# Patient Record
Sex: FEMALE | Race: WHITE | NOT HISPANIC OR LATINO | Employment: OTHER | URBAN - METROPOLITAN AREA
[De-identification: names, ages, dates, MRNs, and addresses within clinical notes are randomized per-mention and may not be internally consistent; named-entity substitution may affect disease eponyms.]

---

## 2017-05-01 ENCOUNTER — APPOINTMENT (OUTPATIENT)
Dept: WOMENS IMAGING | Facility: HOSPITAL | Age: 82
End: 2017-05-01

## 2017-05-01 PROCEDURE — G0206 DX MAMMO INCL CAD UNI: HCPCS | Performed by: RADIOLOGY

## 2017-05-01 PROCEDURE — G0279 TOMOSYNTHESIS, MAMMO: HCPCS | Performed by: RADIOLOGY

## 2017-05-01 PROCEDURE — MDREVIEWSP: Performed by: RADIOLOGY

## 2017-05-01 PROCEDURE — 76641 ULTRASOUND BREAST COMPLETE: CPT | Performed by: RADIOLOGY

## 2018-01-04 ENCOUNTER — APPOINTMENT (OUTPATIENT)
Dept: WOMENS IMAGING | Facility: HOSPITAL | Age: 83
End: 2018-01-04

## 2018-01-04 PROCEDURE — MDREVIEWSP: Performed by: RADIOLOGY

## 2018-01-04 PROCEDURE — G0279 TOMOSYNTHESIS, MAMMO: HCPCS | Performed by: RADIOLOGY

## 2018-01-04 PROCEDURE — 77065 DX MAMMO INCL CAD UNI: CPT | Performed by: RADIOLOGY

## 2018-01-04 PROCEDURE — 77067 SCR MAMMO BI INCL CAD: CPT | Performed by: RADIOLOGY

## 2018-01-04 PROCEDURE — 77063 BREAST TOMOSYNTHESIS BI: CPT | Performed by: RADIOLOGY

## 2018-07-05 ENCOUNTER — APPOINTMENT (OUTPATIENT)
Dept: WOMENS IMAGING | Facility: HOSPITAL | Age: 83
End: 2018-07-05

## 2018-07-05 PROCEDURE — G0279 TOMOSYNTHESIS, MAMMO: HCPCS | Performed by: RADIOLOGY

## 2018-07-05 PROCEDURE — MDREVIEWSP: Performed by: RADIOLOGY

## 2018-07-05 PROCEDURE — 77065 DX MAMMO INCL CAD UNI: CPT | Performed by: RADIOLOGY

## 2019-01-09 ENCOUNTER — APPOINTMENT (OUTPATIENT)
Dept: WOMENS IMAGING | Facility: HOSPITAL | Age: 84
End: 2019-01-09

## 2019-01-09 PROCEDURE — G0279 TOMOSYNTHESIS, MAMMO: HCPCS | Performed by: RADIOLOGY

## 2019-01-09 PROCEDURE — MDREVIEWSP: Performed by: RADIOLOGY

## 2019-01-09 PROCEDURE — 77066 DX MAMMO INCL CAD BI: CPT | Performed by: RADIOLOGY

## 2019-01-15 ENCOUNTER — APPOINTMENT (OUTPATIENT)
Dept: WOMENS IMAGING | Facility: HOSPITAL | Age: 84
End: 2019-01-15

## 2019-01-15 PROCEDURE — 19081 BX BREAST 1ST LESION STRTCTC: CPT | Performed by: RADIOLOGY

## 2019-10-25 ENCOUNTER — HOSPITAL ENCOUNTER (OUTPATIENT)
Dept: GENERAL RADIOLOGY | Facility: HOSPITAL | Age: 84
Discharge: HOME | End: 2019-10-25

## 2019-10-25 ENCOUNTER — HOSPITAL ENCOUNTER (OUTPATIENT)
Dept: GENERAL RADIOLOGY | Facility: HOSPITAL | Age: 84
Discharge: HOME | End: 2019-10-25
Admitting: INTERNAL MEDICINE

## 2019-10-25 ENCOUNTER — TRANSCRIBE ORDERS (OUTPATIENT)
Dept: ADMINISTRATIVE | Facility: HOSPITAL | Age: 84
End: 2019-10-25

## 2019-10-25 DIAGNOSIS — M54.6 PAIN IN THORACIC SPINE: Primary | ICD-10-CM

## 2019-10-25 PROCEDURE — 71046 X-RAY EXAM CHEST 2 VIEWS: CPT

## 2019-10-25 PROCEDURE — 72070 X-RAY EXAM THORAC SPINE 2VWS: CPT

## 2019-12-19 ENCOUNTER — OFFICE VISIT (OUTPATIENT)
Dept: CARDIOLOGY | Facility: CLINIC | Age: 84
End: 2019-12-19

## 2019-12-19 VITALS
WEIGHT: 134.8 LBS | DIASTOLIC BLOOD PRESSURE: 70 MMHG | BODY MASS INDEX: 24.8 KG/M2 | HEART RATE: 78 BPM | SYSTOLIC BLOOD PRESSURE: 142 MMHG | HEIGHT: 62 IN

## 2019-12-19 DIAGNOSIS — R00.2 PALPITATIONS: ICD-10-CM

## 2019-12-19 DIAGNOSIS — I10 ESSENTIAL HYPERTENSION: Primary | ICD-10-CM

## 2019-12-19 DIAGNOSIS — R01.1 HEART MURMUR: ICD-10-CM

## 2019-12-19 PROCEDURE — 93000 ELECTROCARDIOGRAM COMPLETE: CPT | Performed by: INTERNAL MEDICINE

## 2019-12-19 PROCEDURE — 99204 OFFICE O/P NEW MOD 45 MIN: CPT | Performed by: INTERNAL MEDICINE

## 2019-12-19 RX ORDER — ROSUVASTATIN CALCIUM 10 MG/1
1 TABLET, COATED ORAL DAILY
COMMUNITY

## 2019-12-19 RX ORDER — CETIRIZINE HYDROCHLORIDE 10 MG/1
1 TABLET ORAL DAILY PRN
COMMUNITY
End: 2022-03-15

## 2019-12-19 RX ORDER — HYDROCHLOROTHIAZIDE 25 MG/1
1 TABLET ORAL DAILY
COMMUNITY

## 2019-12-19 RX ORDER — MONTELUKAST SODIUM 10 MG/1
1 TABLET ORAL DAILY
Status: ON HOLD | COMMUNITY
End: 2021-12-26

## 2019-12-19 RX ORDER — AMLODIPINE BESYLATE AND BENAZEPRIL HYDROCHLORIDE 10; 40 MG/1; MG/1
1 CAPSULE ORAL DAILY
COMMUNITY
End: 2021-12-27 | Stop reason: HOSPADM

## 2019-12-19 NOTE — PROGRESS NOTES
Date of Office Visit: 2019  Encounter Provider: Belkys Burton MD  Place of Service: [facility] CARDIOLOGY  Patient Name: Jia Gillis  :1935      Patient ID:  Jia Gillis is a 84 y.o. female is here for hypertension.           History of Present Illness    She has a history of hypertension, hyperlipidemia, thyroid disease, history of breast cancer with radiation therapy.  Her breast cancer was first diagnosed in the  on the right side and she had a lumpectomy.  She had a recurrence in  on the right side and had a lumpectomy with radiation therapy and tamoxifen.  She then had a recurrence of breast cancer on the left side in  with a left-sided lumpectomy and no adjuvant therapy.    She had an echocardiogram done 2015 showing normal left ventricular systolic function, ejection fraction 60-65% with aortic valve sclerosis, no stenosis and trace aortic insufficiency.    Labs on 3/16/2019 show normal TSH, normal CBC, creatinine 1.19, otherwise normal CMP, HDL 58, triglycerides 165, , non-.    She is , has 3 children and is retired.  She uses no cigarettes and has social alcohol.  She has 4 cups of coffee per day.  Her sister had a myocardial infarction at the age of 40.  Her mother heart disease and hypertension.  Her son has diabetes.    She has had a lot of back pain and due to this, she is stopped exercising.  She got a steroid injection and began having some mild chest pressure after this and knew her blood pressure was elevated.  She saw Dr. Ramos and her blood pressure was 200.  He doubled her amlodipine.  Is taken about a month but now her blood pressure is back down to the 140s.  She has been checking it at home.  She has had no further chest tightness or pressure.  She denies exertional dyspnea, orthopnea or PND.  She does notice palpitations frequently, sometimes these keep her awake at night.  She has had no dizziness  or syncope.  She has no prior cardiac history.  She is not had blood clots, asthma, emphysema or renal failure.        Past Medical History:   Diagnosis Date   • Benign essential hypertension    • Heart murmur    • Hyperlipidemia    • Malignant tumor of breast (CMS/HCC)    • Palpitations          Past Surgical History:   Procedure Laterality Date   • BREAST BIOPSY     • BREAST SURGERY     • THYROID SURGERY         Current Outpatient Medications on File Prior to Visit   Medication Sig Dispense Refill   • amLODIPine-benazepril (LOTREL) 10-40 MG per capsule Take 1 capsule by mouth Daily.     • Biotin 5 MG tablet dispersible Take 1 tablet by mouth Daily.     • Calcium Carbonate (CALCIUM 600 PO) Take 1 tablet by mouth Daily.     • cetirizine (zyrTEC) 10 MG tablet Take 1 tablet by mouth Daily.     • hydroCHLOROthiazide (HYDRODIURIL) 25 MG tablet Take 1 tablet by mouth Daily.     • montelukast (SINGULAIR) 10 MG tablet Take 1 tablet by mouth Daily.     • rosuvastatin (CRESTOR) 10 MG tablet Take 1 tablet by mouth Daily.       No current facility-administered medications on file prior to visit.        Social History     Socioeconomic History   • Marital status:      Spouse name: Not on file   • Number of children: Not on file   • Years of education: Not on file   • Highest education level: Not on file   Tobacco Use   • Smoking status: Never Smoker   • Smokeless tobacco: Never Used   • Tobacco comment: caffeine use   Substance and Sexual Activity   • Alcohol use: Yes     Comment: social   • Drug use: Never           Review of Systems   Constitution: Negative.   HENT: Negative for congestion.    Eyes: Negative for vision loss in left eye and vision loss in right eye.   Respiratory: Negative.  Negative for cough, hemoptysis, shortness of breath, sleep disturbances due to breathing, snoring, sputum production and wheezing.    Endocrine: Negative.    Hematologic/Lymphatic: Negative.    Skin: Negative for poor wound healing  "and rash.   Musculoskeletal: Negative for falls, gout, muscle cramps and myalgias.   Gastrointestinal: Negative for abdominal pain, diarrhea, dysphagia, hematemesis, melena, nausea and vomiting.   Neurological: Negative for excessive daytime sleepiness, dizziness, headaches, light-headedness, loss of balance, seizures and vertigo.   Psychiatric/Behavioral: Negative for depression and substance abuse. The patient is not nervous/anxious.        Procedures    ECG 12 Lead  Date/Time: 12/19/2019 9:41 AM  Performed by: Belkys Burton MD  Authorized by: Belkys Burton MD   Comparison: compared with previous ECG   Similar to previous ECG  Rhythm: sinus tachycardia    Clinical impression: normal ECG                Objective:      Vitals:    12/19/19 0926 12/19/19 0927   BP: 140/70 142/70   BP Location: Right arm Left arm   Patient Position: Sitting Sitting   Pulse: 78    Weight: 61.1 kg (134 lb 12.8 oz)    Height: 157.5 cm (62\")      Body mass index is 24.66 kg/m².    Physical Exam   Constitutional: She is oriented to person, place, and time. She appears well-developed and well-nourished. No distress.   HENT:   Head: Normocephalic and atraumatic.   Eyes: Conjunctivae are normal. No scleral icterus.   Neck: Neck supple. No JVD present. Carotid bruit is not present. No thyromegaly present.   Cardiovascular: Normal rate, S1 normal, S2 normal and intact distal pulses. An irregularly irregular rhythm present.  No extrasystoles are present. PMI is not displaced. Exam reveals no gallop.   Murmur heard.   Midsystolic murmur is present with a grade of 3/6 at the upper right sternal border and upper left sternal border.  Pulses:       Carotid pulses are 2+ on the right side, and 2+ on the left side.       Radial pulses are 2+ on the right side, and 2+ on the left side.        Dorsalis pedis pulses are 2+ on the right side, and 2+ on the left side.        Posterior tibial pulses are 2+ on the right side, and 2+ on the " left side.   Pulmonary/Chest: Effort normal and breath sounds normal. No respiratory distress. She has no wheezes. She has no rhonchi. She has no rales. She exhibits no tenderness.   Abdominal: Soft. Bowel sounds are normal. She exhibits no distension, no abdominal bruit and no mass. There is no tenderness.   Musculoskeletal: She exhibits no edema or deformity.   Lymphadenopathy:     She has no cervical adenopathy.   Neurological: She is alert and oriented to person, place, and time. No cranial nerve deficit.   Skin: Skin is warm and dry. No rash noted. She is not diaphoretic. No cyanosis. No pallor. Nails show no clubbing.   Psychiatric: She has a normal mood and affect. Judgment normal.   Vitals reviewed.      Lab Review:       Assessment:      Diagnosis Plan   1. Essential hypertension     2. Palpitations  Holter Monitor - 24 Hour   3. Heart murmur  Adult Transthoracic Echo Complete W/ Cont if Necessary Per Protocol     1. Hypertension, goal less than 120/80.  She is in the 140s.  I do not plan to make medication changes but I think she could get to target if she would make some lifestyle changes.  2. Hyperlipidemia, on rosuvastatin.  3. Murmur on examination consistent with possible aortic stenosis and tricuspid insufficiency.  Set up echo.  4. Palpitations, and irregularity on examination today.  Set up Holter.  5. H/o breast cancer with radiation.      Plan:       See jan in 6 months and set up testing at [place].  Advised 64 ounces of water daily, decrease caffeine consumption, start exercising by walking, decrease sodium to less than 2000 mg daily.  No medication changes made today.

## 2020-01-09 ENCOUNTER — HOSPITAL ENCOUNTER (OUTPATIENT)
Dept: CARDIOLOGY | Facility: HOSPITAL | Age: 85
Discharge: HOME | End: 2020-01-09
Admitting: INTERNAL MEDICINE

## 2020-01-09 VITALS
BODY MASS INDEX: 24.66 KG/M2 | DIASTOLIC BLOOD PRESSURE: 75 MMHG | WEIGHT: 134 LBS | SYSTOLIC BLOOD PRESSURE: 148 MMHG | HEIGHT: 62 IN

## 2020-01-09 DIAGNOSIS — R01.1 HEART MURMUR: ICD-10-CM

## 2020-01-09 LAB
BH CV ECHO MEAS - ACS: 1.8 CM
BH CV ECHO MEAS - AO MAX PG (FULL): 7.5 MMHG
BH CV ECHO MEAS - AO MAX PG: 11.9 MMHG
BH CV ECHO MEAS - AO MEAN PG (FULL): 4 MMHG
BH CV ECHO MEAS - AO MEAN PG: 6 MMHG
BH CV ECHO MEAS - AO ROOT AREA (BSA CORRECTED): 1.6
BH CV ECHO MEAS - AO ROOT AREA: 5.3 CM^2
BH CV ECHO MEAS - AO ROOT DIAM: 2.6 CM
BH CV ECHO MEAS - AO V2 MAX: 172.5 CM/SEC
BH CV ECHO MEAS - AO V2 MEAN: 114 CM/SEC
BH CV ECHO MEAS - AO V2 VTI: 37.1 CM
BH CV ECHO MEAS - ASC AORTA: 2.8 CM
BH CV ECHO MEAS - AVA(I,A): 1.6 CM^2
BH CV ECHO MEAS - AVA(I,D): 1.6 CM^2
BH CV ECHO MEAS - AVA(V,A): 1.5 CM^2
BH CV ECHO MEAS - AVA(V,D): 1.5 CM^2
BH CV ECHO MEAS - BSA(HAYCOCK): 1.6 M^2
BH CV ECHO MEAS - BSA: 1.6 M^2
BH CV ECHO MEAS - BZI_BMI: 24.5 KILOGRAMS/M^2
BH CV ECHO MEAS - BZI_METRIC_HEIGHT: 157.5 CM
BH CV ECHO MEAS - BZI_METRIC_WEIGHT: 60.8 KG
BH CV ECHO MEAS - EDV(CUBED): 87.5 ML
BH CV ECHO MEAS - EDV(MOD-SP2): 94.8 ML
BH CV ECHO MEAS - EDV(MOD-SP4): 78.4 ML
BH CV ECHO MEAS - EDV(TEICH): 89.6 ML
BH CV ECHO MEAS - EF(CUBED): 72.1 %
BH CV ECHO MEAS - EF(MOD-BP): 70 %
BH CV ECHO MEAS - EF(MOD-SP2): 72.2 %
BH CV ECHO MEAS - EF(MOD-SP4): 68.5 %
BH CV ECHO MEAS - EF(TEICH): 64 %
BH CV ECHO MEAS - ESV(CUBED): 24.4 ML
BH CV ECHO MEAS - ESV(MOD-SP2): 26.4 ML
BH CV ECHO MEAS - ESV(MOD-SP4): 24.7 ML
BH CV ECHO MEAS - ESV(TEICH): 32.2 ML
BH CV ECHO MEAS - FS: 34.7 %
BH CV ECHO MEAS - IVS/LVPW: 1.2
BH CV ECHO MEAS - IVSD: 0.98 CM
BH CV ECHO MEAS - LAT PEAK E' VEL: 9 CM/SEC
BH CV ECHO MEAS - LV DIASTOLIC VOL/BSA (35-75): 48.6 ML/M^2
BH CV ECHO MEAS - LV MASS(C)D: 131.1 GRAMS
BH CV ECHO MEAS - LV MASS(C)DI: 81.3 GRAMS/M^2
BH CV ECHO MEAS - LV MAX PG: 4.4 MMHG
BH CV ECHO MEAS - LV MEAN PG: 2 MMHG
BH CV ECHO MEAS - LV SYSTOLIC VOL/BSA (12-30): 15.3 ML/M^2
BH CV ECHO MEAS - LV V1 MAX: 105 CM/SEC
BH CV ECHO MEAS - LV V1 MEAN: 68.4 CM/SEC
BH CV ECHO MEAS - LV V1 VTI: 24.2 CM
BH CV ECHO MEAS - LVIDD: 4.4 CM
BH CV ECHO MEAS - LVIDS: 2.9 CM
BH CV ECHO MEAS - LVLD AP2: 7.2 CM
BH CV ECHO MEAS - LVLD AP4: 7 CM
BH CV ECHO MEAS - LVLS AP2: 5.9 CM
BH CV ECHO MEAS - LVLS AP4: 5.5 CM
BH CV ECHO MEAS - LVOT AREA (M): 2.5 CM^2
BH CV ECHO MEAS - LVOT AREA: 2.4 CM^2
BH CV ECHO MEAS - LVOT DIAM: 1.8 CM
BH CV ECHO MEAS - LVPWD: 0.83 CM
BH CV ECHO MEAS - MED PEAK E' VEL: 8 CM/SEC
BH CV ECHO MEAS - MV A DUR: 0.14 SEC
BH CV ECHO MEAS - MV A MAX VEL: 118 CM/SEC
BH CV ECHO MEAS - MV DEC SLOPE: 496 CM/SEC^2
BH CV ECHO MEAS - MV DEC TIME: 229 SEC
BH CV ECHO MEAS - MV E MAX VEL: 111 CM/SEC
BH CV ECHO MEAS - MV E/A: 0.94
BH CV ECHO MEAS - MV MAX PG: 6.2 MMHG
BH CV ECHO MEAS - MV MEAN PG: 2 MMHG
BH CV ECHO MEAS - MV P1/2T MAX VEL: 126 CM/SEC
BH CV ECHO MEAS - MV P1/2T: 74.4 MSEC
BH CV ECHO MEAS - MV V2 MAX: 124 CM/SEC
BH CV ECHO MEAS - MV V2 MEAN: 61.8 CM/SEC
BH CV ECHO MEAS - MV V2 VTI: 44.1 CM
BH CV ECHO MEAS - MVA P1/2T LCG: 1.7 CM^2
BH CV ECHO MEAS - MVA(P1/2T): 3 CM^2
BH CV ECHO MEAS - MVA(VTI): 1.3 CM^2
BH CV ECHO MEAS - PA ACC TIME: 0.08 SEC
BH CV ECHO MEAS - PA MAX PG (FULL): 2.7 MMHG
BH CV ECHO MEAS - PA MAX PG: 5.5 MMHG
BH CV ECHO MEAS - PA PR(ACCEL): 44.4 MMHG
BH CV ECHO MEAS - PA V2 MAX: 117 CM/SEC
BH CV ECHO MEAS - PULM A REVS DUR: 0.17 SEC
BH CV ECHO MEAS - PULM A REVS VEL: 36.9 CM/SEC
BH CV ECHO MEAS - PULM DIAS VEL: 56.3 CM/SEC
BH CV ECHO MEAS - PULM S/D: 1.7
BH CV ECHO MEAS - PULM SYS VEL: 94.6 CM/SEC
BH CV ECHO MEAS - PVA(V,A): 1.6 CM^2
BH CV ECHO MEAS - PVA(V,D): 1.6 CM^2
BH CV ECHO MEAS - QP/QS: 0.7
BH CV ECHO MEAS - RAP SYSTOLE: 3 MMHG
BH CV ECHO MEAS - RV MAX PG: 2.8 MMHG
BH CV ECHO MEAS - RV MEAN PG: 2 MMHG
BH CV ECHO MEAS - RV V1 MAX: 83.5 CM/SEC
BH CV ECHO MEAS - RV V1 MEAN: 56.9 CM/SEC
BH CV ECHO MEAS - RV V1 VTI: 18 CM
BH CV ECHO MEAS - RVOT AREA: 2.3 CM^2
BH CV ECHO MEAS - RVOT DIAM: 1.7 CM
BH CV ECHO MEAS - RVSP: 33.8 MMHG
BH CV ECHO MEAS - SI(AO): 122 ML/M^2
BH CV ECHO MEAS - SI(CUBED): 39.2 ML/M^2
BH CV ECHO MEAS - SI(LVOT): 36.1 ML/M^2
BH CV ECHO MEAS - SI(MOD-SP2): 42.4 ML/M^2
BH CV ECHO MEAS - SI(MOD-SP4): 33.3 ML/M^2
BH CV ECHO MEAS - SI(TEICH): 35.6 ML/M^2
BH CV ECHO MEAS - SV(AO): 196.7 ML
BH CV ECHO MEAS - SV(CUBED): 63.1 ML
BH CV ECHO MEAS - SV(LVOT): 58.2 ML
BH CV ECHO MEAS - SV(MOD-SP2): 68.4 ML
BH CV ECHO MEAS - SV(MOD-SP4): 53.7 ML
BH CV ECHO MEAS - SV(RVOT): 40.9 ML
BH CV ECHO MEAS - SV(TEICH): 57.4 ML
BH CV ECHO MEAS - TAPSE (>1.6): 1.8 CM
BH CV ECHO MEAS - TR MAX VEL: 277 CM/SEC
BH CV ECHO MEASUREMENTS AVERAGE E/E' RATIO: 13.06
BH CV VAS BP LEFT ARM: NORMAL MMHG
BH CV XLRA - RV BASE: 2.8 CM
BH CV XLRA - TDI S': 15 CM/SEC
LEFT ATRIUM VOLUME INDEX: 32 ML/M2
MAXIMAL PREDICTED HEART RATE: 137 BPM
STRESS TARGET HR: 116 BPM

## 2020-01-09 PROCEDURE — 93306 TTE W/DOPPLER COMPLETE: CPT | Performed by: INTERNAL MEDICINE

## 2020-01-09 PROCEDURE — 93306 TTE W/DOPPLER COMPLETE: CPT

## 2020-01-16 ENCOUNTER — HOSPITAL ENCOUNTER (OUTPATIENT)
Dept: CARDIOLOGY | Facility: HOSPITAL | Age: 85
Discharge: HOME | End: 2020-01-16
Admitting: INTERNAL MEDICINE

## 2020-01-16 DIAGNOSIS — R00.2 PALPITATIONS: ICD-10-CM

## 2020-01-16 PROCEDURE — 93226 XTRNL ECG REC<48 HR SCAN A/R: CPT

## 2020-01-16 PROCEDURE — 93225 XTRNL ECG REC<48 HRS REC: CPT

## 2020-01-20 PROCEDURE — 93227 XTRNL ECG REC<48 HR R&I: CPT | Performed by: INTERNAL MEDICINE

## 2020-03-03 ENCOUNTER — APPOINTMENT (OUTPATIENT)
Dept: WOMENS IMAGING | Facility: HOSPITAL | Age: 85
End: 2020-03-03

## 2020-03-03 PROCEDURE — 77063 BREAST TOMOSYNTHESIS BI: CPT | Performed by: RADIOLOGY

## 2020-03-03 PROCEDURE — 77067 SCR MAMMO BI INCL CAD: CPT | Performed by: RADIOLOGY

## 2021-04-20 ENCOUNTER — APPOINTMENT (OUTPATIENT)
Dept: WOMENS IMAGING | Facility: HOSPITAL | Age: 86
End: 2021-04-20

## 2021-04-20 PROCEDURE — 77063 BREAST TOMOSYNTHESIS BI: CPT | Performed by: RADIOLOGY

## 2021-04-20 PROCEDURE — 77067 SCR MAMMO BI INCL CAD: CPT | Performed by: RADIOLOGY

## 2021-08-25 ENCOUNTER — TELEPHONE (OUTPATIENT)
Dept: GASTROENTEROLOGY | Facility: CLINIC | Age: 86
End: 2021-08-25

## 2021-09-03 NOTE — TELEPHONE ENCOUNTER
SPOKE WITH PATIENT.  SHE STATES HAD NOT HEARD ANYTHING ABOUT SCHEDULING HER COLONOSCOPY.  SHE STATES HAD POSITIVE COLOGUARD FIRST WEEK OF 08/2021.  EXPLAINED HER REFERRAL FROM DR. HERMINIO JARRETT DID NOT MENTION POSITIVE COLOGAURD TEST ONLY SCREENING COLONOSCOPY.      OFFICE APPOINTMENT WITH MADE WITH SALINAS COOPER ON 10/14/2021.

## 2021-12-25 PROCEDURE — 93010 ELECTROCARDIOGRAM REPORT: CPT | Performed by: INTERNAL MEDICINE

## 2021-12-25 PROCEDURE — 96361 HYDRATE IV INFUSION ADD-ON: CPT

## 2021-12-25 PROCEDURE — 96360 HYDRATION IV INFUSION INIT: CPT

## 2021-12-25 PROCEDURE — 93005 ELECTROCARDIOGRAM TRACING: CPT

## 2021-12-25 PROCEDURE — 99285 EMERGENCY DEPT VISIT HI MDM: CPT

## 2021-12-25 RX ORDER — SODIUM CHLORIDE 0.9 % (FLUSH) 0.9 %
10 SYRINGE (ML) INJECTION AS NEEDED
Status: DISCONTINUED | OUTPATIENT
Start: 2021-12-25 | End: 2021-12-27 | Stop reason: HOSPADM

## 2021-12-26 ENCOUNTER — HOSPITAL ENCOUNTER (OUTPATIENT)
Facility: HOSPITAL | Age: 86
Setting detail: OBSERVATION
Discharge: HOME | End: 2021-12-27
Attending: EMERGENCY MEDICINE | Admitting: HOSPITALIST

## 2021-12-26 DIAGNOSIS — I49.3 FREQUENT PVCS: ICD-10-CM

## 2021-12-26 DIAGNOSIS — E78.5 HYPERLIPIDEMIA, UNSPECIFIED HYPERLIPIDEMIA TYPE: ICD-10-CM

## 2021-12-26 DIAGNOSIS — R55 NEAR SYNCOPE: Primary | ICD-10-CM

## 2021-12-26 DIAGNOSIS — I10 PRIMARY HYPERTENSION: ICD-10-CM

## 2021-12-26 PROBLEM — R73.9 HYPERGLYCEMIA: Status: ACTIVE | Noted: 2021-12-26

## 2021-12-26 PROBLEM — N18.32 STAGE 3B CHRONIC KIDNEY DISEASE: Status: ACTIVE | Noted: 2021-12-26

## 2021-12-26 PROBLEM — R00.2 PALPITATIONS: Status: ACTIVE | Noted: 2021-12-26

## 2021-12-26 LAB
ALBUMIN SERPL-MCNC: 4.8 G/DL (ref 3.5–5.2)
ALBUMIN/GLOB SERPL: 1.8 G/DL
ALP SERPL-CCNC: 105 U/L (ref 39–117)
ALT SERPL W P-5'-P-CCNC: 17 U/L (ref 1–33)
ANION GAP SERPL CALCULATED.3IONS-SCNC: 11 MMOL/L (ref 5–15)
ANION GAP SERPL CALCULATED.3IONS-SCNC: 12 MMOL/L (ref 5–15)
AST SERPL-CCNC: 14 U/L (ref 1–32)
BASOPHILS # BLD AUTO: 0.04 10*3/MM3 (ref 0–0.2)
BASOPHILS # BLD AUTO: 0.04 10*3/MM3 (ref 0–0.2)
BASOPHILS NFR BLD AUTO: 0.5 % (ref 0–1.5)
BASOPHILS NFR BLD AUTO: 0.6 % (ref 0–1.5)
BILIRUB SERPL-MCNC: 0.2 MG/DL (ref 0–1.2)
BUN SERPL-MCNC: 23 MG/DL (ref 8–23)
BUN SERPL-MCNC: 30 MG/DL (ref 8–23)
BUN/CREAT SERPL: 24.2 (ref 7–25)
BUN/CREAT SERPL: 25.4 (ref 7–25)
CALCIUM SPEC-SCNC: 8.8 MG/DL (ref 8.6–10.5)
CALCIUM SPEC-SCNC: 9.3 MG/DL (ref 8.6–10.5)
CHLORIDE SERPL-SCNC: 100 MMOL/L (ref 98–107)
CHLORIDE SERPL-SCNC: 104 MMOL/L (ref 98–107)
CO2 SERPL-SCNC: 26 MMOL/L (ref 22–29)
CO2 SERPL-SCNC: 26 MMOL/L (ref 22–29)
CREAT SERPL-MCNC: 0.95 MG/DL (ref 0.57–1)
CREAT SERPL-MCNC: 1.18 MG/DL (ref 0.57–1)
DEPRECATED RDW RBC AUTO: 39.2 FL (ref 37–54)
DEPRECATED RDW RBC AUTO: 40.8 FL (ref 37–54)
EOSINOPHIL # BLD AUTO: 0.01 10*3/MM3 (ref 0–0.4)
EOSINOPHIL # BLD AUTO: 0.03 10*3/MM3 (ref 0–0.4)
EOSINOPHIL NFR BLD AUTO: 0.2 % (ref 0.3–6.2)
EOSINOPHIL NFR BLD AUTO: 0.4 % (ref 0.3–6.2)
ERYTHROCYTE [DISTWIDTH] IN BLOOD BY AUTOMATED COUNT: 12.6 % (ref 12.3–15.4)
ERYTHROCYTE [DISTWIDTH] IN BLOOD BY AUTOMATED COUNT: 12.6 % (ref 12.3–15.4)
GFR SERPL CREATININE-BSD FRML MDRD: 44 ML/MIN/1.73
GFR SERPL CREATININE-BSD FRML MDRD: 56 ML/MIN/1.73
GLOBULIN UR ELPH-MCNC: 2.6 GM/DL
GLUCOSE SERPL-MCNC: 173 MG/DL (ref 65–99)
GLUCOSE SERPL-MCNC: 92 MG/DL (ref 65–99)
HCT VFR BLD AUTO: 36.4 % (ref 34–46.6)
HCT VFR BLD AUTO: 39.1 % (ref 34–46.6)
HGB BLD-MCNC: 12.7 G/DL (ref 12–15.9)
HGB BLD-MCNC: 12.7 G/DL (ref 12–15.9)
HOLD SPECIMEN: NORMAL
HOLD SPECIMEN: NORMAL
IMM GRANULOCYTES # BLD AUTO: 0.02 10*3/MM3 (ref 0–0.05)
IMM GRANULOCYTES # BLD AUTO: 0.03 10*3/MM3 (ref 0–0.05)
IMM GRANULOCYTES NFR BLD AUTO: 0.3 % (ref 0–0.5)
IMM GRANULOCYTES NFR BLD AUTO: 0.4 % (ref 0–0.5)
INR PPP: 0.98 (ref 0.9–1.1)
LYMPHOCYTES # BLD AUTO: 1.18 10*3/MM3 (ref 0.7–3.1)
LYMPHOCYTES # BLD AUTO: 2.61 10*3/MM3 (ref 0.7–3.1)
LYMPHOCYTES NFR BLD AUTO: 17.9 % (ref 19.6–45.3)
LYMPHOCYTES NFR BLD AUTO: 31.8 % (ref 19.6–45.3)
MAGNESIUM SERPL-MCNC: 2.1 MG/DL (ref 1.6–2.4)
MAGNESIUM SERPL-MCNC: 2.2 MG/DL (ref 1.6–2.4)
MCH RBC QN AUTO: 28.8 PG (ref 26.6–33)
MCH RBC QN AUTO: 30.1 PG (ref 26.6–33)
MCHC RBC AUTO-ENTMCNC: 32.5 G/DL (ref 31.5–35.7)
MCHC RBC AUTO-ENTMCNC: 34.9 G/DL (ref 31.5–35.7)
MCV RBC AUTO: 86.3 FL (ref 79–97)
MCV RBC AUTO: 88.7 FL (ref 79–97)
MONOCYTES # BLD AUTO: 0.27 10*3/MM3 (ref 0.1–0.9)
MONOCYTES # BLD AUTO: 0.76 10*3/MM3 (ref 0.1–0.9)
MONOCYTES NFR BLD AUTO: 4.1 % (ref 5–12)
MONOCYTES NFR BLD AUTO: 9.3 % (ref 5–12)
NEUTROPHILS NFR BLD AUTO: 4.74 10*3/MM3 (ref 1.7–7)
NEUTROPHILS NFR BLD AUTO: 5.07 10*3/MM3 (ref 1.7–7)
NEUTROPHILS NFR BLD AUTO: 57.6 % (ref 42.7–76)
NEUTROPHILS NFR BLD AUTO: 76.9 % (ref 42.7–76)
NRBC BLD AUTO-RTO: 0 /100 WBC (ref 0–0.2)
NRBC BLD AUTO-RTO: 0 /100 WBC (ref 0–0.2)
PLATELET # BLD AUTO: 208 10*3/MM3 (ref 140–450)
PLATELET # BLD AUTO: 218 10*3/MM3 (ref 140–450)
PMV BLD AUTO: 10.9 FL (ref 6–12)
PMV BLD AUTO: 11.3 FL (ref 6–12)
POTASSIUM SERPL-SCNC: 3.8 MMOL/L (ref 3.5–5.2)
POTASSIUM SERPL-SCNC: 4.1 MMOL/L (ref 3.5–5.2)
PROT SERPL-MCNC: 7.4 G/DL (ref 6–8.5)
PROTHROMBIN TIME: 12.8 SECONDS (ref 11.7–14.2)
QT INTERVAL: 443 MS
QT INTERVAL: 446 MS
RBC # BLD AUTO: 4.22 10*6/MM3 (ref 3.77–5.28)
RBC # BLD AUTO: 4.41 10*6/MM3 (ref 3.77–5.28)
SARS-COV-2 ORF1AB RESP QL NAA+PROBE: NOT DETECTED
SODIUM SERPL-SCNC: 138 MMOL/L (ref 136–145)
SODIUM SERPL-SCNC: 141 MMOL/L (ref 136–145)
TROPONIN T SERPL-MCNC: <0.01 NG/ML (ref 0–0.03)
TSH SERPL DL<=0.05 MIU/L-ACNC: 2.49 UIU/ML (ref 0.27–4.2)
WBC NRBC COR # BLD: 6.59 10*3/MM3 (ref 3.4–10.8)
WBC NRBC COR # BLD: 8.21 10*3/MM3 (ref 3.4–10.8)
WHOLE BLOOD HOLD SPECIMEN: NORMAL
WHOLE BLOOD HOLD SPECIMEN: NORMAL

## 2021-12-26 PROCEDURE — 93010 ELECTROCARDIOGRAM REPORT: CPT | Performed by: INTERNAL MEDICINE

## 2021-12-26 PROCEDURE — 93005 ELECTROCARDIOGRAM TRACING: CPT | Performed by: NURSE PRACTITIONER

## 2021-12-26 PROCEDURE — 83735 ASSAY OF MAGNESIUM: CPT

## 2021-12-26 PROCEDURE — 85025 COMPLETE CBC W/AUTO DIFF WBC: CPT

## 2021-12-26 PROCEDURE — U0004 COV-19 TEST NON-CDC HGH THRU: HCPCS | Performed by: PHYSICIAN ASSISTANT

## 2021-12-26 PROCEDURE — 80053 COMPREHEN METABOLIC PANEL: CPT

## 2021-12-26 PROCEDURE — 83735 ASSAY OF MAGNESIUM: CPT | Performed by: NURSE PRACTITIONER

## 2021-12-26 PROCEDURE — 99214 OFFICE O/P EST MOD 30 MIN: CPT | Performed by: INTERNAL MEDICINE

## 2021-12-26 PROCEDURE — G0378 HOSPITAL OBSERVATION PER HR: HCPCS

## 2021-12-26 PROCEDURE — 85025 COMPLETE CBC W/AUTO DIFF WBC: CPT | Performed by: NURSE PRACTITIONER

## 2021-12-26 PROCEDURE — 96361 HYDRATE IV INFUSION ADD-ON: CPT

## 2021-12-26 PROCEDURE — 85610 PROTHROMBIN TIME: CPT | Performed by: NURSE PRACTITIONER

## 2021-12-26 PROCEDURE — 84484 ASSAY OF TROPONIN QUANT: CPT

## 2021-12-26 PROCEDURE — 84443 ASSAY THYROID STIM HORMONE: CPT | Performed by: NURSE PRACTITIONER

## 2021-12-26 RX ORDER — BACLOFEN 10 MG/1
10 TABLET ORAL 3 TIMES DAILY PRN
Status: DISCONTINUED | OUTPATIENT
Start: 2021-12-26 | End: 2021-12-27 | Stop reason: HOSPADM

## 2021-12-26 RX ORDER — ONDANSETRON 2 MG/ML
4 INJECTION INTRAMUSCULAR; INTRAVENOUS EVERY 6 HOURS PRN
Status: DISCONTINUED | OUTPATIENT
Start: 2021-12-26 | End: 2021-12-27 | Stop reason: HOSPADM

## 2021-12-26 RX ORDER — ACETAMINOPHEN 650 MG/1
650 SUPPOSITORY RECTAL EVERY 4 HOURS PRN
Status: DISCONTINUED | OUTPATIENT
Start: 2021-12-26 | End: 2021-12-27 | Stop reason: HOSPADM

## 2021-12-26 RX ORDER — SODIUM CHLORIDE 0.9 % (FLUSH) 0.9 %
10 SYRINGE (ML) INJECTION AS NEEDED
Status: DISCONTINUED | OUTPATIENT
Start: 2021-12-26 | End: 2021-12-27 | Stop reason: HOSPADM

## 2021-12-26 RX ORDER — BACLOFEN 10 MG/1
10 TABLET ORAL 3 TIMES DAILY PRN
COMMUNITY
End: 2022-01-24 | Stop reason: ALTCHOICE

## 2021-12-26 RX ORDER — NITROGLYCERIN 0.4 MG/1
0.4 TABLET SUBLINGUAL
Status: DISCONTINUED | OUTPATIENT
Start: 2021-12-26 | End: 2021-12-27 | Stop reason: HOSPADM

## 2021-12-26 RX ORDER — SODIUM CHLORIDE 9 MG/ML
100 INJECTION, SOLUTION INTRAVENOUS CONTINUOUS
Status: DISCONTINUED | OUTPATIENT
Start: 2021-12-26 | End: 2021-12-27 | Stop reason: HOSPADM

## 2021-12-26 RX ORDER — ACETAMINOPHEN 325 MG/1
650 TABLET ORAL EVERY 4 HOURS PRN
Status: DISCONTINUED | OUTPATIENT
Start: 2021-12-26 | End: 2021-12-27 | Stop reason: HOSPADM

## 2021-12-26 RX ORDER — ACETAMINOPHEN 160 MG/5ML
650 SOLUTION ORAL EVERY 4 HOURS PRN
Status: DISCONTINUED | OUTPATIENT
Start: 2021-12-26 | End: 2021-12-27 | Stop reason: HOSPADM

## 2021-12-26 RX ORDER — METOPROLOL SUCCINATE 25 MG/1
25 TABLET, EXTENDED RELEASE ORAL DAILY
COMMUNITY
End: 2021-12-27 | Stop reason: HOSPADM

## 2021-12-26 RX ORDER — ROSUVASTATIN CALCIUM 10 MG/1
10 TABLET, COATED ORAL DAILY
Status: DISCONTINUED | OUTPATIENT
Start: 2021-12-26 | End: 2021-12-27 | Stop reason: HOSPADM

## 2021-12-26 RX ORDER — SODIUM CHLORIDE 0.9 % (FLUSH) 0.9 %
10 SYRINGE (ML) INJECTION EVERY 12 HOURS SCHEDULED
Status: DISCONTINUED | OUTPATIENT
Start: 2021-12-26 | End: 2021-12-27 | Stop reason: HOSPADM

## 2021-12-26 RX ADMIN — SODIUM CHLORIDE, PRESERVATIVE FREE 10 ML: 5 INJECTION INTRAVENOUS at 20:35

## 2021-12-26 RX ADMIN — ROSUVASTATIN CALCIUM 10 MG: 10 TABLET, FILM COATED ORAL at 15:45

## 2021-12-26 RX ADMIN — SODIUM CHLORIDE, PRESERVATIVE FREE 10 ML: 5 INJECTION INTRAVENOUS at 13:15

## 2021-12-26 RX ADMIN — SODIUM CHLORIDE 100 ML/HR: 9 INJECTION, SOLUTION INTRAVENOUS at 13:15

## 2021-12-26 RX ADMIN — SODIUM CHLORIDE 100 ML/HR: 9 INJECTION, SOLUTION INTRAVENOUS at 23:28

## 2021-12-26 RX ADMIN — SODIUM CHLORIDE 100 ML/HR: 9 INJECTION, SOLUTION INTRAVENOUS at 05:47

## 2021-12-26 NOTE — ED PROVIDER NOTES
" EMERGENCY DEPARTMENT ENCOUNTER    Room Number:  08/08  Date of encounter:  12/26/2021  PCP: Wyatt Ramos MD  Historian: Patient      HPI:  Chief Complaint: Syncope       Context: Jia Gillis is a 86 y.o. female with past medical history of HTN, HLD, and palpitations who presents to the ED c/o near syncope.  History obtained from patient and family.  Patient states that she was at home for Allyson celebration, and after dinner at around 7:00 she got hot/flushed, became \"dizzy headed\", sweaty, felt very weak and had no energy to the point where she had to lay her head down on the table.  Patient states that she did not ever pass out, have any associated chest pain, shortness of breath, fever, cough, or recent illness.  Patient's family was there and checked her blood pressure which was in the 120s, but they noted that her heart rate was in the 30s and 40s and was reading as \"irregular\".  Patient has followed with Dr. Burton, cardiology, in the past and has had a Holter monitor and an echo and was told the only abnormality was for PVCs.      PAST MEDICAL HISTORY  Active Ambulatory Problems     Diagnosis Date Noted   • No Active Ambulatory Problems     Resolved Ambulatory Problems     Diagnosis Date Noted   • No Resolved Ambulatory Problems     Past Medical History:   Diagnosis Date   • Benign essential hypertension    • Heart murmur    • Hyperlipidemia    • Malignant tumor of breast (HCC)    • Palpitations          PAST SURGICAL HISTORY  Past Surgical History:   Procedure Laterality Date   • BREAST BIOPSY     • BREAST SURGERY     • THYROID SURGERY           FAMILY HISTORY  Family History   Problem Relation Age of Onset   • Heart disease Mother    • Hypertension Mother    • Hypertension Father    • Cancer Father    • Heart attack Sister    • Diabetes Son          SOCIAL HISTORY  Social History     Socioeconomic History   • Marital status:    Tobacco Use   • Smoking status: Never Smoker   • Smokeless " tobacco: Never Used   • Tobacco comment: caffeine use   Substance and Sexual Activity   • Alcohol use: Yes     Comment: social   • Drug use: Never         ALLERGIES  Patient has no known allergies.        REVIEW OF SYSTEMS  Review of Systems     All systems reviewed and negative except for those discussed in HPI.       PHYSICAL EXAM    I have reviewed the triage vital signs and nursing notes.    ED Triage Vitals [12/25/21 2316]   Temp Heart Rate Resp BP SpO2   98.4 °F (36.9 °C) 68 14 168/69 99 %      Temp src Heart Rate Source Patient Position BP Location FiO2 (%)   Tympanic Monitor Sitting Right arm --       Physical Exam  GENERAL: Alert and orient x3, not distressed  HENT: nares patent  EYES: no scleral icterus  CV: irregular rhythm, regular rate  RESPIRATORY: normal effort  ABDOMEN: soft/nontender  MUSCULOSKELETAL: no deformity  NEURO: alert, moves all extremities, no focal neuro deficits,, no rashes follows commands  SKIN: warm, dry        LAB RESULTS  Recent Results (from the past 24 hour(s))   ECG 12 Lead    Collection Time: 12/25/21 11:28 PM   Result Value Ref Range    QT Interval 443 ms   Comprehensive Metabolic Panel    Collection Time: 12/26/21 12:22 AM    Specimen: Arm, Left; Blood   Result Value Ref Range    Glucose 173 (H) 65 - 99 mg/dL    BUN 30 (H) 8 - 23 mg/dL    Creatinine 1.18 (H) 0.57 - 1.00 mg/dL    Sodium 138 136 - 145 mmol/L    Potassium 4.1 3.5 - 5.2 mmol/L    Chloride 100 98 - 107 mmol/L    CO2 26.0 22.0 - 29.0 mmol/L    Calcium 9.3 8.6 - 10.5 mg/dL    Total Protein 7.4 6.0 - 8.5 g/dL    Albumin 4.80 3.50 - 5.20 g/dL    ALT (SGPT) 17 1 - 33 U/L    AST (SGOT) 14 1 - 32 U/L    Alkaline Phosphatase 105 39 - 117 U/L    Total Bilirubin 0.2 0.0 - 1.2 mg/dL    eGFR Non African Amer 44 (L) >60 mL/min/1.73    Globulin 2.6 gm/dL    A/G Ratio 1.8 g/dL    BUN/Creatinine Ratio 25.4 (H) 7.0 - 25.0    Anion Gap 12.0 5.0 - 15.0 mmol/L   Magnesium    Collection Time: 12/26/21 12:22 AM    Specimen: Arm, Left;  Blood   Result Value Ref Range    Magnesium 2.1 1.6 - 2.4 mg/dL   Troponin    Collection Time: 12/26/21 12:22 AM    Specimen: Arm, Left; Blood   Result Value Ref Range    Troponin T <0.010 0.000 - 0.030 ng/mL   Green Top (Gel)    Collection Time: 12/26/21 12:22 AM   Result Value Ref Range    Extra Tube Hold for add-ons.    Lavender Top    Collection Time: 12/26/21 12:22 AM   Result Value Ref Range    Extra Tube hold for add-on    Gold Top - SST    Collection Time: 12/26/21 12:22 AM   Result Value Ref Range    Extra Tube Hold for add-ons.    Light Blue Top    Collection Time: 12/26/21 12:22 AM   Result Value Ref Range    Extra Tube hold for add-on    CBC Auto Differential    Collection Time: 12/26/21 12:22 AM    Specimen: Arm, Left; Blood   Result Value Ref Range    WBC 6.59 3.40 - 10.80 10*3/mm3    RBC 4.41 3.77 - 5.28 10*6/mm3    Hemoglobin 12.7 12.0 - 15.9 g/dL    Hematocrit 39.1 34.0 - 46.6 %    MCV 88.7 79.0 - 97.0 fL    MCH 28.8 26.6 - 33.0 pg    MCHC 32.5 31.5 - 35.7 g/dL    RDW 12.6 12.3 - 15.4 %    RDW-SD 40.8 37.0 - 54.0 fl    MPV 10.9 6.0 - 12.0 fL    Platelets 208 140 - 450 10*3/mm3    Neutrophil % 76.9 (H) 42.7 - 76.0 %    Lymphocyte % 17.9 (L) 19.6 - 45.3 %    Monocyte % 4.1 (L) 5.0 - 12.0 %    Eosinophil % 0.2 (L) 0.3 - 6.2 %    Basophil % 0.6 0.0 - 1.5 %    Immature Grans % 0.3 0.0 - 0.5 %    Neutrophils, Absolute 5.07 1.70 - 7.00 10*3/mm3    Lymphocytes, Absolute 1.18 0.70 - 3.10 10*3/mm3    Monocytes, Absolute 0.27 0.10 - 0.90 10*3/mm3    Eosinophils, Absolute 0.01 0.00 - 0.40 10*3/mm3    Basophils, Absolute 0.04 0.00 - 0.20 10*3/mm3    Immature Grans, Absolute 0.02 0.00 - 0.05 10*3/mm3    nRBC 0.0 0.0 - 0.2 /100 WBC       Ordered the above labs and independently reviewed the results.        RADIOLOGY  No Radiology Exams Resulted Within Past 24 Hours    I ordered the above noted radiological studies. Reviewed by me and discussed with radiologist.  See dictation for official radiology  interpretation.      PROCEDURES    Procedures      MEDICATIONS GIVEN IN ER    Medications   sodium chloride 0.9 % flush 10 mL (has no administration in time range)   sodium chloride 0.9 % flush 10 mL (has no administration in time range)   sodium chloride 0.9 % flush 10 mL (has no administration in time range)   nitroglycerin (NITROSTAT) SL tablet 0.4 mg (has no administration in time range)   sodium chloride 0.9 % infusion (has no administration in time range)   acetaminophen (TYLENOL) tablet 650 mg (has no administration in time range)     Or   acetaminophen (TYLENOL) 160 MG/5ML solution 650 mg (has no administration in time range)     Or   acetaminophen (TYLENOL) suppository 650 mg (has no administration in time range)   ondansetron (ZOFRAN) injection 4 mg (has no administration in time range)         PROGRESS, DATA ANALYSIS, CONSULTS, AND MEDICAL DECISION MAKING    All labs have been independently reviewed by me.  All radiology studies have been reviewed by me and discussed with radiologist dictating the report.   EKG's independently viewed and interpreted by me.  Discussion below represents my analysis of pertinent findings related to patient's condition, differential diagnosis, treatment plan and final disposition.    DDx: Includes but not limited to syncope, near syncope, A. fib, a flutter, arrhythmia, electrolyte abnormality    ED Course as of 12/26/21 0446   Sun Dec 26, 2021   0238 EKG          EKG time: 23:28  Rhythm/Rate: Sinus rhythm at 69 bpm with frequent PVCs  P waves and CT: Normal  QRS, axis: Normal  ST and T waves: No acute ST/T wave changes    Interpreted Contemporaneously by me, independently viewed  Changed compared to prior EKG of 6/5/2015, the PVCs are new   [ALBINA]   0347 Patient care discussed with Nereyda NIÑO, will admit per Dr. Rahman. [ALBINA]      ED Course User Index  [ALBINA] Micah Baca PA       MDM: Patient with near syncopal episode and bradycardia with frequent PVCs on EKG will be  placed in observation for further evaluation and cardiology consultation.    PPE: The patient wore a surgical mask throughout the entire patient encounter. I wore an N95.    AS OF 04:46 EST VITALS:    BP - 125/69  HR - 60  TEMP - 98.4 °F (36.9 °C) (Tympanic)  O2 SATS - 97%        DIAGNOSIS  Final diagnoses:   Near syncope   Frequent PVCs   Primary hypertension   Hyperlipidemia, unspecified hyperlipidemia type         DISPOSITION  ADMISSION    Discussed treatment plan and reason for admission with pt/family and admitting physician.  Pt/family voiced understanding of the plan for admission for further testing/treatment as needed.                  Micah Baca PA  12/26/21 0415       Micah Baca PA  12/26/21 0446

## 2021-12-26 NOTE — ED NOTES
Patient was wearing a face mask throughout our encounter.  This RN wore appropriate PPE throughout the encounter.  Hand hygiene was performed before and after patient encounter.        Juan Carlos Carballo RN  12/26/21 5945

## 2021-12-26 NOTE — CONSULTS
[facility] Consult Note       Encounter Date:21  Patient:Jia Gillis  :1935  MRN:[id number]    Date of Admission: 2021  Date of Encounter Visit: 21  Encounter Provider: Armaan Singleton MD  Referring Provider: Zeynep Rahman MD  Place of Service: [facility]  Patient Care Team:  Wyatt Ramos MD as PCP - General (Family Medicine)  Connor Medrano MD as Consulting Physician (Hematology and Oncology)      Consulted for: near syncope, bradycardia    Chief Complaint: near syncopal episode      History of Presenting Illness:       Ms. Gillis is a 86 y.o. woman with past medical history notable for hypertension, mixed hyperlipidemia, hypothyroidism, breast cancer, and PVCs who presents to the emergency room after presyncopal episode yesterday evening.  In general patient had been doing reasonably well.  About 6 months ago her PVCs were more problematic and her primary care physician did start her on 25 of metoprolol succinate.  She actually felt worse after starting this medication.  She felt like her palpitations were more prominent and stronger and she could not really tolerate this medication.  This was cut in half and she has been on a 12.5 mg dose of metoprolol succinate for a couple of months but really has noticed that her PVCs have not really improved.  Last couple of weeks they have increased slightly she did have a glass of wine after a stressful day about a week ago which she noticed significantly increased the episodes of him but really outside of this has not had much in the way of alcohol or caffeine.  She does have some chest pressure associated with him.  Things came to ahead yesterday where she was finishing up dinner she started to feel dizzy and lightheaded.  She sat down and almost passed out.  She slashed her head down when family were checking on her they noticed that her blood pressure was slightly low in the 120s which would  be low for her given that she typically runs in the 140s or 150s.  The blood pressure machine was also reading a heart rates in the 40s.  It also read out as irregular.  She was brought to the emergency room where she was evaluated.      Review of Systems:  Review of Systems   Constitutional: Positive for malaise/fatigue.   HENT: Negative.    Eyes: Negative.    Cardiovascular: Positive for palpitations.   Respiratory: Positive for shortness of breath.    Endocrine: Negative.    Hematologic/Lymphatic: Negative.    Skin: Negative.    Musculoskeletal: Negative.    Gastrointestinal: Negative.    Genitourinary: Negative.    Neurological: Negative.    Psychiatric/Behavioral: Negative.    Allergic/Immunologic: Negative.        Medications:    Current Facility-Administered Medications:   •  acetaminophen (TYLENOL) tablet 650 mg, 650 mg, Oral, Q4H PRN **OR** acetaminophen (TYLENOL) 160 MG/5ML solution 650 mg, 650 mg, Oral, Q4H PRN **OR** acetaminophen (TYLENOL) suppository 650 mg, 650 mg, Rectal, Q4H PRN, Nereyda Caldwell APRN  •  nitroglycerin (NITROSTAT) SL tablet 0.4 mg, 0.4 mg, Sublingual, Q5 Min PRN, Nereyda Caldwell APRN  •  ondansetron (ZOFRAN) injection 4 mg, 4 mg, Intravenous, Q6H PRN, Nereyda Caldwell APRN  •  sodium chloride 0.9 % flush 10 mL, 10 mL, Intravenous, PRN, Emergency, Triage Protocol, MD  •  sodium chloride 0.9 % flush 10 mL, 10 mL, Intravenous, Q12H, Nereyda Caldwell APRN  •  sodium chloride 0.9 % flush 10 mL, 10 mL, Intravenous, PRN, Nereyda Caldwell APRN  •  sodium chloride 0.9 % infusion, 100 mL/hr, Intravenous, Continuous, Nereyda Caldwell APRN, Last Rate: 100 mL/hr at 12/26/21 0547, 100 mL/hr at 12/26/21 0547    Current Outpatient Medications:   •  amLODIPine-benazepril (LOTREL) 10-40 MG per capsule, Take 1 capsule by mouth Daily., Disp: , Rfl:   •  Biotin 5 MG tablet dispersible, Take 1 tablet by mouth Daily., Disp: , Rfl:   •  Calcium Carbonate (CALCIUM 600 PO), Take 1  tablet by mouth Daily., Disp: , Rfl:   •  cetirizine (zyrTEC) 10 MG tablet, Take 1 tablet by mouth Daily., Disp: , Rfl:   •  hydroCHLOROthiazide (HYDRODIURIL) 25 MG tablet, Take 1 tablet by mouth Daily., Disp: , Rfl:   •  metoprolol succinate XL (TOPROL-XL) 25 MG 24 hr tablet, Take 25 mg by mouth Daily., Disp: , Rfl:   •  montelukast (SINGULAIR) 10 MG tablet, Take 1 tablet by mouth Daily., Disp: , Rfl:   •  rosuvastatin (CRESTOR) 10 MG tablet, Take 1 tablet by mouth Daily., Disp: , Rfl:     No Known Allergies    Past Medical History:   Diagnosis Date   • Benign essential hypertension    • Heart murmur    • Hyperlipidemia    • Malignant tumor of breast (HCC)    • Palpitations        Past Surgical History:   Procedure Laterality Date   • BREAST BIOPSY     • BREAST SURGERY     • THYROID SURGERY         Social History     Socioeconomic History   • Marital status:    Tobacco Use   • Smoking status: Never Smoker   • Smokeless tobacco: Never Used   • Tobacco comment: caffeine use   Substance and Sexual Activity   • Alcohol use: Yes     Comment: social   • Drug use: Never       Family History   Problem Relation Age of Onset   • Heart disease Mother    • Hypertension Mother    • Hypertension Father    • Cancer Father    • Heart attack Sister    • Diabetes Son        The following portions of the patient's history were reviewed and updated as appropriate: allergies, current medications, past family history, past medical history, past social history, past surgical history and problem list.         Objective:      Temp:  [98.4 °F (36.9 °C)] 98.4 °F (36.9 °C)  Heart Rate:  [60-79] 61  Resp:  [14-18] 18  BP: ()/(60-97) 94/81   No intake or output data in the 24 hours ending 12/26/21 0853  Body mass index is 24.51 kg/m².      12/26/21  0405   Weight: 60.8 kg (134 lb)           Physical Exam:  Constitutional: Well appearing, trail, no acute distress   HENT: Oropharynx clear and membrane moist  Eyes: Normal conjunctiva,  no sclera icterus.  Neck: Supple, no carotid bruit bilaterally.  Cardiovascular: Regular rate and rhythm with frequent ectopy, Early peaking systolic murmur over the right upper sternal border, No bilateral lower extremity edema.  Pulmonary: Normal respiratory effort, normal lung sounds, no wheezing.  Abdominal: Soft, nontender, no hepatosplenomegaly, liver is non-pulsatile.  Neurological: Alert and orient x 3.   Skin: Warm, dry, no ecchymosis, no rash.  Psych: Appropriate mood and affect. Normal judgment and insight.         Lab Review:   Results from last 7 days   Lab Units 12/26/21  0022   SODIUM mmol/L 138   POTASSIUM mmol/L 4.1   CHLORIDE mmol/L 100   CO2 mmol/L 26.0   BUN mg/dL 30*   CREATININE mg/dL 1.18*   GLUCOSE mg/dL 173*   CALCIUM mg/dL 9.3   AST (SGOT) U/L 14   ALT (SGPT) U/L 17     Results from last 7 days   Lab Units 12/26/21  0022   TROPONIN T ng/mL <0.010     Results from last 7 days   Lab Units 12/26/21  0022   WBC 10*3/mm3 6.59   HEMOGLOBIN g/dL 12.7   HEMATOCRIT % 39.1   PLATELETS 10*3/mm3 208         Results from last 7 days   Lab Units 12/26/21  0022   MAGNESIUM mg/dL 2.1           Invalid input(s): LDLCALC  The ASCVD Risk score ([place] DELANO Jr., et al., 2013) failed to calculate for the following reasons:    The 2013 ASCVD risk score is only valid for ages 40 to 79             EKG      Baseline    I reviewed her EKG above with demonstrates no significant change beyond frequent PVCs are new.      Previous cardiac testing:     Echocardiogram 1/9/2020:  · Calculated EF = 70.0%.  · Left ventricular systolic function is normal.  · Essentially normal echo.    Holter monitor 1/9/2020:  3.6% burden of PVCs       Assessment:           Near syncope         Plan:       Ms. Gillis is a 86 y.o. woman with past medical history notable for hypertension, mixed hyperlipidemia, hypothyroidism, breast cancer, and PVCs who presents to the emergency room after presyncopal episode yesterday evening.  She is now having  fairly frequent PVC episodes.  I think this is clearly more than 3.6% which was seen over 2 years ago.  Sounds like symptomatically she is also had more problems with PVCs.  Do appreciate a murmur on exam would recommend an echocardiogram to rule out any structural normalities and any new valvular abnormalities which may have presented since 2 years ago.  Additionally given the high burden of PVCs to make sure were not missing any significant ischemia and have ordered a stress MPI.  Does not seem like she did well with beta-blocker therapy another option would be to try calcium channel blocker or even thinking about low-dose suppressive amiodarone.  Think we can reassess her PVC burden on discharge with a 2-week monitor and make sure were not missing any other arrhythmia such as nonsustained ventricular tachycardia.      Frequent PVCs:  · Normal thyroid and electrolytes  · We will follow up on echocardiogram and stress test  · May consider adding on calcium channel blocker or low-dose amiodarone as patient does not seem to do well with beta-blocker therapy and furthermore not really helping with her symptoms    Heart murmur:  · All up echocardiogram    Hypertension:  · May consider adding calcium channel blocker such as Cardizem to see if this will help out with blood pressure and PVC burden        Thank you for allowing me to participate in the care of Jia Gillis. Feel free to contact me directly with any further questions or concerns.    Armaan Singleton MD  [facility]  12/26/21  08:53 EST

## 2021-12-26 NOTE — H&P
Patient Name:  Jia Gillis  YOB: 1935  MRN:  [id number]  Admit Date:  12/26/2021  Patient Care Team:  Wyatt Ramos MD as PCP - General (Family Medicine)  Connor Medrano MD as Consulting Physician (Hematology and Oncology)      Subjective   History Present Illness     Chief Complaint   Patient presents with   • Syncope     HPI  Ms. Gillis is a 86 y.o. with a history of heart palpitations, HTN, HLD and CKD 3 that presents to [facility] complaining of a near syncopal episode.  Patient reports she was at a [employer] yesterday when she became acutely dizzy and diaphoretic.  Family member was there who is a physical therapist and took her vitals, she was noted to have heart rate in the 30s to 40s.  She states she did not pass out all the way but felt as if she could have.  She has had similar symptoms in the past and follows with Dr. Serna for PVCs but she has never had a near syncopal episode where she experiences the above symptoms associated with nausea.  She denies chest pain, SOA, edema, fever, chills, vomiting and diarrhea.  Patient is still very independent and active.  She does report not drinking enough water during the day and drinks coffee but has decreased this to 2 cups a day.    Review of Systems   Constitutional: Positive for fatigue. Negative for chills and fever.   HENT: Negative for congestion and sore throat.    Eyes: Negative for discharge and itching.   Respiratory: Negative for cough, chest tightness and shortness of breath.    Cardiovascular: Positive for palpitations. Negative for chest pain and leg swelling.   Gastrointestinal: Positive for nausea. Negative for abdominal pain and vomiting.   Endocrine: Negative for cold intolerance and heat intolerance.   Genitourinary: Negative for difficulty urinating and dysuria.   Musculoskeletal: Negative for back pain and gait problem.   Skin: Negative for color change and pallor.    Allergic/Immunologic: Negative for environmental allergies and food allergies.   Neurological: Positive for dizziness and light-headedness. Negative for syncope.   Hematological: Negative for adenopathy. Does not bruise/bleed easily.   Psychiatric/Behavioral: Negative for agitation and behavioral problems.        Personal History     Past Medical History:   Diagnosis Date   • Benign essential hypertension    • Heart murmur    • Hyperlipidemia    • Malignant tumor of breast (HCC)    • Palpitations      Past Surgical History:   Procedure Laterality Date   • BREAST BIOPSY     • BREAST SURGERY     • THYROID SURGERY       Family History   Problem Relation Age of Onset   • Heart disease Mother    • Hypertension Mother    • Hypertension Father    • Cancer Father    • Heart attack Sister    • Diabetes Son      Social History     Tobacco Use   • Smoking status: Never Smoker   • Smokeless tobacco: Never Used   • Tobacco comment: caffeine use   Substance Use Topics   • Alcohol use: Yes     Comment: social   • Drug use: Never     No current facility-administered medications on file prior to encounter.     Current Outpatient Medications on File Prior to Encounter   Medication Sig Dispense Refill   • amLODIPine-benazepril (LOTREL) 10-40 MG per capsule Take 1 capsule by mouth Daily.     • Biotin 5 MG tablet dispersible Take 1 tablet by mouth Daily.     • Calcium Carbonate (CALCIUM 600 PO) Take 1 tablet by mouth Daily.     • cetirizine (zyrTEC) 10 MG tablet Take 1 tablet by mouth Daily.     • hydroCHLOROthiazide (HYDRODIURIL) 25 MG tablet Take 1 tablet by mouth Daily.     • metoprolol succinate XL (TOPROL-XL) 25 MG 24 hr tablet Take 25 mg by mouth Daily.     • montelukast (SINGULAIR) 10 MG tablet Take 1 tablet by mouth Daily.     • rosuvastatin (CRESTOR) 10 MG tablet Take 1 tablet by mouth Daily.       No Known Allergies    Objective    Objective     Vital Signs  Temp:  [98.4 °F (36.9 °C)] 98.4 °F (36.9 °C)  Heart Rate:  [60-79]  63  Resp:  [14-18] 18  BP: ()/(60-97) 94/81  SpO2:  [96 %-99 %] 97 %  on   ;   Device (Oxygen Therapy): room air  Body mass index is 24.51 kg/m².    Physical Exam  Vitals and nursing note reviewed. Exam conducted with a chaperone present.   Constitutional:       Appearance: Normal appearance.   HENT:      Head: Normocephalic and atraumatic.   Eyes:      Extraocular Movements: Extraocular movements intact.      Conjunctiva/sclera: Conjunctivae normal.   Cardiovascular:      Rate and Rhythm: Normal rate. Rhythm regularly irregular.      Heart sounds: Murmur heard.        Comments: damian at times  Pulmonary:      Effort: Pulmonary effort is normal. No respiratory distress.      Breath sounds: Normal breath sounds.   Abdominal:      General: Bowel sounds are normal. There is no distension.      Palpations: Abdomen is soft.      Tenderness: There is no abdominal tenderness.   Musculoskeletal:         General: No swelling. Normal range of motion.      Cervical back: Normal range of motion and neck supple.   Skin:     General: Skin is warm and dry.   Neurological:      Mental Status: She is alert and oriented to person, place, and time. Mental status is at baseline.   Psychiatric:         Mood and Affect: Mood normal.         Behavior: Behavior normal.         Results Review:  I reviewed the patient's new clinical results.  I reviewed the patient's new imaging results and agree with the interpretation.  I reviewed the patient's other test results and agree with the interpretation  I personally viewed and interpreted the patient's EKG/Telemetry data  Discussed with ED provider.    Lab Results (last 24 hours)     Procedure Component Value Units Date/Time    CBC & Differential [[id number]]  (Abnormal) Collected: 12/26/21 0022    Specimen: Blood from Arm, Left Updated: 12/26/21 0047    Narrative:      The following orders were created for panel order CBC & Differential.  Procedure                               Abnormality          Status                     ---------                               -----------         ------                     CBC Auto Differential[[id number]]        Abnormal            Final result                 Please view results for these tests on the individual orders.    Comprehensive Metabolic Panel [[id number]]  (Abnormal) Collected: 12/26/21 0022    Specimen: Blood from Arm, Left Updated: 12/26/21 0108     Glucose 173 mg/dL      BUN 30 mg/dL      Creatinine 1.18 mg/dL      Sodium 138 mmol/L      Potassium 4.1 mmol/L      Chloride 100 mmol/L      CO2 26.0 mmol/L      Calcium 9.3 mg/dL      Total Protein 7.4 g/dL      Albumin 4.80 g/dL      ALT (SGPT) 17 U/L      AST (SGOT) 14 U/L      Alkaline Phosphatase 105 U/L      Total Bilirubin 0.2 mg/dL      eGFR Non African Amer 44 mL/min/1.73      Globulin 2.6 gm/dL      A/G Ratio 1.8 g/dL      BUN/Creatinine Ratio 25.4     Anion Gap 12.0 mmol/L     Narrative:      GFR Normal >60  Chronic Kidney Disease <60  Kidney Failure <15      Magnesium [[id number]]  (Normal) Collected: 12/26/21 0022    Specimen: Blood from Arm, Left Updated: 12/26/21 0107     Magnesium 2.1 mg/dL     Troponin [[id number]]  (Normal) Collected: 12/26/21 0022    Specimen: Blood from Arm, Left Updated: 12/26/21 0107     Troponin T <0.010 ng/mL     Narrative:      Troponin T Reference Range:  <= 0.03 ng/mL-   Negative for AMI  >0.03 ng/mL-     Abnormal for myocardial necrosis.  Clinicians would have to utilize clinical acumen, EKG, Troponin and serial changes to determine if it is an Acute Myocardial Infarction or myocardial injury due to an underlying chronic condition.       Results may be falsely decreased if patient taking Biotin.      CBC Auto Differential [[id number]]  (Abnormal) Collected: 12/26/21 0022    Specimen: Blood from Arm, Left Updated: 12/26/21 0047     WBC 6.59 10*3/mm3      RBC 4.41 10*6/mm3      Hemoglobin 12.7 g/dL      Hematocrit 39.1 %      MCV 88.7 fL      MCH 28.8 pg      MCHC  32.5 g/dL      RDW 12.6 %      RDW-SD 40.8 fl      MPV 10.9 fL      Platelets 208 10*3/mm3      Neutrophil % 76.9 %      Lymphocyte % 17.9 %      Monocyte % 4.1 %      Eosinophil % 0.2 %      Basophil % 0.6 %      Immature Grans % 0.3 %      Neutrophils, Absolute 5.07 10*3/mm3      Lymphocytes, Absolute 1.18 10*3/mm3      Monocytes, Absolute 0.27 10*3/mm3      Eosinophils, Absolute 0.01 10*3/mm3      Basophils, Absolute 0.04 10*3/mm3      Immature Grans, Absolute 0.02 10*3/mm3      nRBC 0.0 /100 WBC     COVID PRE-OP / PRE-PROCEDURE SCREENING ORDER (NO ISOLATION) - Swab, Nasopharynx [[id number]] Collected: 12/26/21 0305    Specimen: Swab from Nasopharynx Updated: 12/26/21 0318    Narrative:      The following orders were created for panel order COVID PRE-OP / PRE-PROCEDURE SCREENING ORDER (NO ISOLATION) - Swab, Nasopharynx.  Procedure                               Abnormality         Status                     ---------                               -----------         ------                     COVID-19,APTIMA PANTHER(...[[id number]]                      In process                   Please view results for these tests on the individual orders.    COVID-19,APTIMA PANTHER(JULIANNA),BH GO/[facility] ALEC, NP/OP SWAB IN UTM/VTM/SALINE TRANSPORT MEDIA,24 HR TAT - Swab, Nasopharynx [[id number]] Collected: 12/26/21 0305    Specimen: Swab from Nasopharynx Updated: 12/26/21 0318          Imaging Results (Last 24 Hours)     ** No results found for the last 24 hours. **          Results for orders placed during the hospital encounter of 01/09/20    Adult Transthoracic Echo Complete W/ Cont if Necessary Per Protocol    Interpretation Summary  · Calculated EF = 70.0%.  · Left ventricular systolic function is normal.  · Essentially normal echo.      ECG 12 Lead   Preliminary Result   HEART RATE= 67  bpm   RR Interval= 888  ms   CA Interval= 167  ms   P Horizontal Axis= 26  deg   P Front Axis= 74  deg   QRSD Interval= 104  ms   QT Interval= 446   ms   QRS Axis= 64  deg   T Wave Axis= 63  deg   - ABNORMAL ECG -   Sinus rhythm   Multiple ventricular premature complexes   Electronically Signed By:    Date and Time of Study: 2021-12-26 07:40:17      ECG 12 Lead   Preliminary Result   HEART RATE= 69  bpm   RR Interval= 869  ms   HI Interval= 174  ms   P Horizontal Axis= 20  deg   P Front Axis= 73  deg   QRSD Interval= 104  ms   QT Interval= 443  ms   QRS Axis= 75  deg   T Wave Axis= 51  deg   - ABNORMAL ECG -   Sinus rhythm   Ventricular bigeminy   Repol abnrm suggests ischemia, diffuse leads   Electronically Signed By:    Date and Time of Study: 2021-12-25 23:28:00           Assessment/Plan     Active Hospital Problems    Diagnosis  POA   • **Near syncope [R55]  Yes   • HLD (hyperlipidemia) [E78.5]  Yes   • HTN (hypertension) [I10]  Yes   • Stage 3b chronic kidney disease (HCC) [N18.32]  Yes   • Palpitations [R00.2]  Yes   • Hyperglycemia [R73.9]  Yes      Resolved Hospital Problems   No resolved problems to display.       · Near syncope/bradycardia/hypotension: Cardiology has been consulted. EKG with multiple PVCs. She has not had an echocardiogram in 2 years, this has been ordered.  Will likely need to adjust some of her medications.  Check orthostatics.  Continue IVFs for now. consider cardiac monitoring at CT.   · CKD 3: appears stable when compared to previous creatinine. monitor.   · hyperglycemia: check a1c   · please notify when home med rec is available. otherwise labs are pretty stable and hopefully she can go home in the next day or so pending cardiology.   · I discussed the patient's findings and my recommendations with patient and nursing staff.    VTE Prophylaxis - SCDs.  Code Status - Full code.       SALINAS Reed  [facility]  12/26/21  10:53 EST

## 2021-12-26 NOTE — ED PROVIDER NOTES
MD ATTESTATION NOTE    The BULL and I have discussed this patient's history, physical exam, and treatment plan.  I have reviewed the documentation and personally had a face to face interaction with the patient. I affirm the documentation and agree with the treatment and plan.  The attached note describes my personal findings.      Jia Gillis is a 86 y.o. female who presents to the ED c/o lightheaded dizziness.  Reports that she was at a [place] party and after dinner she felt hot and sweaty.  States her head get dizzy.  States she did not pass out but states she feels became very close.  Evidently family number was with her and noted that her heart rate was in the 30s or 40s.      On exam:  GENERAL: No acute distress  HENT: nares patent  EYES: no scleral icterus  CV: regular rhythm, regular rate  RESPIRATORY: normal effort, clear to auscultation bilaterally  ABDOMEN: soft, nontender nondistended  MUSCULOSKELETAL: no deformity  NEURO: alert, moves all extremities, follows commands  SKIN: warm, dry    Labs  Recent Results (from the past 24 hour(s))   ECG 12 Lead    Collection Time: 12/25/21 11:28 PM   Result Value Ref Range    QT Interval 443 ms   Comprehensive Metabolic Panel    Collection Time: 12/26/21 12:22 AM    Specimen: Arm, Left; Blood   Result Value Ref Range    Glucose 173 (H) 65 - 99 mg/dL    BUN 30 (H) 8 - 23 mg/dL    Creatinine 1.18 (H) 0.57 - 1.00 mg/dL    Sodium 138 136 - 145 mmol/L    Potassium 4.1 3.5 - 5.2 mmol/L    Chloride 100 98 - 107 mmol/L    CO2 26.0 22.0 - 29.0 mmol/L    Calcium 9.3 8.6 - 10.5 mg/dL    Total Protein 7.4 6.0 - 8.5 g/dL    Albumin 4.80 3.50 - 5.20 g/dL    ALT (SGPT) 17 1 - 33 U/L    AST (SGOT) 14 1 - 32 U/L    Alkaline Phosphatase 105 39 - 117 U/L    Total Bilirubin 0.2 0.0 - 1.2 mg/dL    eGFR Non African Amer 44 (L) >60 mL/min/1.73    Globulin 2.6 gm/dL    A/G Ratio 1.8 g/dL    BUN/Creatinine Ratio 25.4 (H) 7.0 - 25.0    Anion Gap 12.0 5.0 - 15.0 mmol/L   Magnesium     Collection Time: 12/26/21 12:22 AM    Specimen: Arm, Left; Blood   Result Value Ref Range    Magnesium 2.1 1.6 - 2.4 mg/dL   Troponin    Collection Time: 12/26/21 12:22 AM    Specimen: Arm, Left; Blood   Result Value Ref Range    Troponin T <0.010 0.000 - 0.030 ng/mL   Green Top (Gel)    Collection Time: 12/26/21 12:22 AM   Result Value Ref Range    Extra Tube Hold for add-ons.    Lavender Top    Collection Time: 12/26/21 12:22 AM   Result Value Ref Range    Extra Tube hold for add-on    Gold Top - SST    Collection Time: 12/26/21 12:22 AM   Result Value Ref Range    Extra Tube Hold for add-ons.    Light Blue Top    Collection Time: 12/26/21 12:22 AM   Result Value Ref Range    Extra Tube hold for add-on    CBC Auto Differential    Collection Time: 12/26/21 12:22 AM    Specimen: Arm, Left; Blood   Result Value Ref Range    WBC 6.59 3.40 - 10.80 10*3/mm3    RBC 4.41 3.77 - 5.28 10*6/mm3    Hemoglobin 12.7 12.0 - 15.9 g/dL    Hematocrit 39.1 34.0 - 46.6 %    MCV 88.7 79.0 - 97.0 fL    MCH 28.8 26.6 - 33.0 pg    MCHC 32.5 31.5 - 35.7 g/dL    RDW 12.6 12.3 - 15.4 %    RDW-SD 40.8 37.0 - 54.0 fl    MPV 10.9 6.0 - 12.0 fL    Platelets 208 140 - 450 10*3/mm3    Neutrophil % 76.9 (H) 42.7 - 76.0 %    Lymphocyte % 17.9 (L) 19.6 - 45.3 %    Monocyte % 4.1 (L) 5.0 - 12.0 %    Eosinophil % 0.2 (L) 0.3 - 6.2 %    Basophil % 0.6 0.0 - 1.5 %    Immature Grans % 0.3 0.0 - 0.5 %    Neutrophils, Absolute 5.07 1.70 - 7.00 10*3/mm3    Lymphocytes, Absolute 1.18 0.70 - 3.10 10*3/mm3    Monocytes, Absolute 0.27 0.10 - 0.90 10*3/mm3    Eosinophils, Absolute 0.01 0.00 - 0.40 10*3/mm3    Basophils, Absolute 0.04 0.00 - 0.20 10*3/mm3    Immature Grans, Absolute 0.02 0.00 - 0.05 10*3/mm3    nRBC 0.0 0.0 - 0.2 /100 WBC       Radiology  No Radiology Exams Resulted Within Past 24 Hours    Medical Decision Making:  ED Course as of 12/26/21 0342   Sun Dec 26, 2021   0238 EKG          EKG time: 23:28  Rhythm/Rate: Sinus rhythm at 69 bpm with  frequent PVCs  P waves and VA: Normal  QRS, axis: Normal  ST and T waves: No acute ST/T wave changes    Interpreted Contemporaneously by me, independently viewed  Changed compared to prior EKG of 6/5/2015, the PVCs are new   [ALBINA]      ED Course User Index  [ALBINA] Micah Baca PA           PPE: Both the patient and I wore a surgical mask throughout the entire patient encounter. I wore protective goggles.     Diagnosis  Final diagnoses:   Near syncope   Frequent PVCs        Oscar Cuadra MD  12/26/21 0342

## 2021-12-26 NOTE — PLAN OF CARE
Problem: Adult Inpatient Plan of Care  Goal: Plan of Care Review  Outcome: Ongoing, Progressing  Flowsheets (Taken 12/26/2021 8146)  Progress: improving  Plan of Care Reviewed With:   patient   daughter  Outcome Summary: Pt admitted to [facility] with near syncope. Vitals stable. NS at 100ml/h. Cardiology consulted. Echo ordered. Stress test in am - NPO at midnight. Daughter at bedside, supportive of pt. +ambulation with staff to bathroom. Will continue to monitor.

## 2021-12-26 NOTE — ED NOTES
Pt in mask throughout encounter. This ERT was in appropriate PPE including a N95 mask and proper eyewear. Hand hygiene performed before entering room and after leaving room.        Omar Lomax  12/26/21 0023

## 2021-12-26 NOTE — ED TRIAGE NOTES
Pt to ED via PV from home accompanied by family member. Family member reports that around 1945 this evening, the patient had a near syncopal episode. Family member reports that the patient slouched over with her head and was assisted to a chair. Family member reports this episode lasted approximately 5 mins. Family member reports that the pt was bradycardic to 47 during the event. Pt reports her only complaints at this time are nausea and fatigue. Pt denies chest pain or shortness of breath.    This RN in appropriate PPE for all patient care interactions. Pt masked and redirected for proper mask use when necessary. Hand hygiene performed before and after all patient care interactions.

## 2021-12-27 ENCOUNTER — APPOINTMENT (OUTPATIENT)
Dept: NUCLEAR MEDICINE | Facility: HOSPITAL | Age: 86
End: 2021-12-27

## 2021-12-27 ENCOUNTER — APPOINTMENT (OUTPATIENT)
Dept: CARDIOLOGY | Facility: HOSPITAL | Age: 86
End: 2021-12-27

## 2021-12-27 VITALS
SYSTOLIC BLOOD PRESSURE: 165 MMHG | BODY MASS INDEX: 24.66 KG/M2 | OXYGEN SATURATION: 96 % | WEIGHT: 134 LBS | DIASTOLIC BLOOD PRESSURE: 69 MMHG | HEIGHT: 62 IN | RESPIRATION RATE: 18 BRPM | HEART RATE: 66 BPM | TEMPERATURE: 98.1 F

## 2021-12-27 LAB
ANION GAP SERPL CALCULATED.3IONS-SCNC: 9.8 MMOL/L (ref 5–15)
AORTIC DIMENSIONLESS INDEX: 0.6 (DI)
BASOPHILS # BLD AUTO: 0.04 10*3/MM3 (ref 0–0.2)
BASOPHILS NFR BLD AUTO: 0.7 % (ref 0–1.5)
BH CV ECHO MEAS - ACS: 1.4 CM
BH CV ECHO MEAS - AO MAX PG (FULL): 4.6 MMHG
BH CV ECHO MEAS - AO MAX PG: 8.3 MMHG
BH CV ECHO MEAS - AO MEAN PG (FULL): 2.9 MMHG
BH CV ECHO MEAS - AO MEAN PG: 4.8 MMHG
BH CV ECHO MEAS - AO ROOT AREA (BSA CORRECTED): 1.5
BH CV ECHO MEAS - AO ROOT AREA: 4.5 CM^2
BH CV ECHO MEAS - AO ROOT DIAM: 2.4 CM
BH CV ECHO MEAS - AO V2 MAX: 143.9 CM/SEC
BH CV ECHO MEAS - AO V2 MEAN: 104.9 CM/SEC
BH CV ECHO MEAS - AO V2 VTI: 35.2 CM
BH CV ECHO MEAS - AVA(I,A): 1.9 CM^2
BH CV ECHO MEAS - AVA(I,D): 1.9 CM^2
BH CV ECHO MEAS - AVA(V,A): 2 CM^2
BH CV ECHO MEAS - AVA(V,D): 2 CM^2
BH CV ECHO MEAS - BSA(HAYCOCK): 1.6 M^2
BH CV ECHO MEAS - BSA: 1.6 M^2
BH CV ECHO MEAS - BZI_BMI: 24.5 KILOGRAMS/M^2
BH CV ECHO MEAS - BZI_METRIC_HEIGHT: 157.5 CM
BH CV ECHO MEAS - BZI_METRIC_WEIGHT: 60.8 KG
BH CV ECHO MEAS - EDV(CUBED): 99.8 ML
BH CV ECHO MEAS - EDV(MOD-SP2): 39 ML
BH CV ECHO MEAS - EDV(MOD-SP4): 50 ML
BH CV ECHO MEAS - EDV(TEICH): 99.2 ML
BH CV ECHO MEAS - EF(CUBED): 78.4 %
BH CV ECHO MEAS - EF(MOD-BP): 69.5 %
BH CV ECHO MEAS - EF(MOD-SP2): 69.2 %
BH CV ECHO MEAS - EF(MOD-SP4): 72 %
BH CV ECHO MEAS - EF(TEICH): 70.7 %
BH CV ECHO MEAS - ESV(CUBED): 21.5 ML
BH CV ECHO MEAS - ESV(MOD-SP2): 12 ML
BH CV ECHO MEAS - ESV(MOD-SP4): 14 ML
BH CV ECHO MEAS - ESV(TEICH): 29.1 ML
BH CV ECHO MEAS - FS: 40 %
BH CV ECHO MEAS - IVS/LVPW: 1
BH CV ECHO MEAS - IVSD: 0.98 CM
BH CV ECHO MEAS - LAT PEAK E' VEL: 8.5 CM/SEC
BH CV ECHO MEAS - LV DIASTOLIC VOL/BSA (35-75): 31 ML/M^2
BH CV ECHO MEAS - LV MASS(C)D: 154.4 GRAMS
BH CV ECHO MEAS - LV MASS(C)DI: 95.8 GRAMS/M^2
BH CV ECHO MEAS - LV MAX PG: 3.7 MMHG
BH CV ECHO MEAS - LV MEAN PG: 2 MMHG
BH CV ECHO MEAS - LV SYSTOLIC VOL/BSA (12-30): 8.7 ML/M^2
BH CV ECHO MEAS - LV V1 MAX: 95.7 CM/SEC
BH CV ECHO MEAS - LV V1 MEAN: 66.6 CM/SEC
BH CV ECHO MEAS - LV V1 VTI: 22.7 CM
BH CV ECHO MEAS - LVIDD: 4.6 CM
BH CV ECHO MEAS - LVIDS: 2.8 CM
BH CV ECHO MEAS - LVLD AP2: 6.4 CM
BH CV ECHO MEAS - LVLD AP4: 6.4 CM
BH CV ECHO MEAS - LVLS AP2: 4.7 CM
BH CV ECHO MEAS - LVLS AP4: 4.9 CM
BH CV ECHO MEAS - LVOT AREA (M): 3.1 CM^2
BH CV ECHO MEAS - LVOT AREA: 3 CM^2
BH CV ECHO MEAS - LVOT DIAM: 2 CM
BH CV ECHO MEAS - LVPWD: 0.96 CM
BH CV ECHO MEAS - MED PEAK E' VEL: 6.9 CM/SEC
BH CV ECHO MEAS - MR MAX PG: 23.4 MMHG
BH CV ECHO MEAS - MR MAX VEL: 241.6 CM/SEC
BH CV ECHO MEAS - MV A MAX VEL: 107.2 CM/SEC
BH CV ECHO MEAS - MV DEC SLOPE: 497 CM/SEC^2
BH CV ECHO MEAS - MV DEC TIME: 222 SEC
BH CV ECHO MEAS - MV E MAX VEL: 104 CM/SEC
BH CV ECHO MEAS - MV E/A: 0.97
BH CV ECHO MEAS - MV MAX PG: 6.4 MMHG
BH CV ECHO MEAS - MV MEAN PG: 3.1 MMHG
BH CV ECHO MEAS - MV P1/2T MAX VEL: 130.3 CM/SEC
BH CV ECHO MEAS - MV P1/2T: 76.8 MSEC
BH CV ECHO MEAS - MV V2 MAX: 126.2 CM/SEC
BH CV ECHO MEAS - MV V2 MEAN: 83 CM/SEC
BH CV ECHO MEAS - MV V2 VTI: 36.2 CM
BH CV ECHO MEAS - MVA P1/2T LCG: 1.7 CM^2
BH CV ECHO MEAS - MVA(P1/2T): 2.9 CM^2
BH CV ECHO MEAS - MVA(VTI): 1.9 CM^2
BH CV ECHO MEAS - PA MAX PG (FULL): -0.92 MMHG
BH CV ECHO MEAS - PA MAX PG: 2 MMHG
BH CV ECHO MEAS - PA V2 MAX: 70.6 CM/SEC
BH CV ECHO MEAS - PVA(V,A): 3.5 CM^2
BH CV ECHO MEAS - PVA(V,D): 3.5 CM^2
BH CV ECHO MEAS - QP/QS: 0.79
BH CV ECHO MEAS - RAP SYSTOLE: 3 MMHG
BH CV ECHO MEAS - RV MAX PG: 2.9 MMHG
BH CV ECHO MEAS - RV MEAN PG: 1.5 MMHG
BH CV ECHO MEAS - RV V1 MAX: 85.3 CM/SEC
BH CV ECHO MEAS - RV V1 MEAN: 57.4 CM/SEC
BH CV ECHO MEAS - RV V1 VTI: 18.8 CM
BH CV ECHO MEAS - RVOT AREA: 2.9 CM^2
BH CV ECHO MEAS - RVOT DIAM: 1.9 CM
BH CV ECHO MEAS - SI(AO): 99.3 ML/M^2
BH CV ECHO MEAS - SI(CUBED): 48.5 ML/M^2
BH CV ECHO MEAS - SI(LVOT): 42.3 ML/M^2
BH CV ECHO MEAS - SI(MOD-SP2): 16.7 ML/M^2
BH CV ECHO MEAS - SI(MOD-SP4): 22.3 ML/M^2
BH CV ECHO MEAS - SI(TEICH): 43.5 ML/M^2
BH CV ECHO MEAS - SV(AO): 160.1 ML
BH CV ECHO MEAS - SV(CUBED): 78.2 ML
BH CV ECHO MEAS - SV(LVOT): 68.3 ML
BH CV ECHO MEAS - SV(MOD-SP2): 27 ML
BH CV ECHO MEAS - SV(MOD-SP4): 36 ML
BH CV ECHO MEAS - SV(RVOT): 54.1 ML
BH CV ECHO MEAS - SV(TEICH): 70.1 ML
BH CV ECHO MEAS - TAPSE (>1.6): 2.6 CM
BH CV ECHO MEASUREMENTS AVERAGE E/E' RATIO: 13.51
BH CV REST NUCLEAR ISOTOPE DOSE: 11 MCI
BH CV STRESS COMMENTS STAGE 1: NORMAL
BH CV STRESS DOSE REGADENOSON STAGE 1: 0.4
BH CV STRESS DURATION MIN STAGE 1: 0
BH CV STRESS DURATION SEC STAGE 1: 10
BH CV STRESS NUCLEAR ISOTOPE DOSE: 30.2 MCI
BH CV STRESS PROTOCOL 1: NORMAL
BH CV STRESS RECOVERY BP: NORMAL MMHG
BH CV STRESS RECOVERY HR: 73 BPM
BH CV STRESS STAGE 1: 1
BH CV XLRA - TDI S': 14.7 CM/SEC
BUN SERPL-MCNC: 21 MG/DL (ref 8–23)
BUN/CREAT SERPL: 22.8 (ref 7–25)
CALCIUM SPEC-SCNC: 8.2 MG/DL (ref 8.6–10.5)
CHLORIDE SERPL-SCNC: 108 MMOL/L (ref 98–107)
CO2 SERPL-SCNC: 23.2 MMOL/L (ref 22–29)
CREAT SERPL-MCNC: 0.92 MG/DL (ref 0.57–1)
DEPRECATED RDW RBC AUTO: 39.9 FL (ref 37–54)
EOSINOPHIL # BLD AUTO: 0.07 10*3/MM3 (ref 0–0.4)
EOSINOPHIL NFR BLD AUTO: 1.2 % (ref 0.3–6.2)
ERYTHROCYTE [DISTWIDTH] IN BLOOD BY AUTOMATED COUNT: 12.7 % (ref 12.3–15.4)
GFR SERPL CREATININE-BSD FRML MDRD: 58 ML/MIN/1.73
GLUCOSE SERPL-MCNC: 89 MG/DL (ref 65–99)
HCT VFR BLD AUTO: 31.3 % (ref 34–46.6)
HGB BLD-MCNC: 10.6 G/DL (ref 12–15.9)
IMM GRANULOCYTES # BLD AUTO: 0.04 10*3/MM3 (ref 0–0.05)
IMM GRANULOCYTES NFR BLD AUTO: 0.7 % (ref 0–0.5)
LEFT ATRIUM VOLUME INDEX: 30 ML/M2
LV EF 2D ECHO EST: 70 %
LV EF NUC BP: 70 %
LYMPHOCYTES # BLD AUTO: 2.3 10*3/MM3 (ref 0.7–3.1)
LYMPHOCYTES NFR BLD AUTO: 40 % (ref 19.6–45.3)
MAXIMAL PREDICTED HEART RATE: 135 BPM
MCH RBC QN AUTO: 29.3 PG (ref 26.6–33)
MCHC RBC AUTO-ENTMCNC: 33.9 G/DL (ref 31.5–35.7)
MCV RBC AUTO: 86.5 FL (ref 79–97)
MONOCYTES # BLD AUTO: 0.53 10*3/MM3 (ref 0.1–0.9)
MONOCYTES NFR BLD AUTO: 9.2 % (ref 5–12)
NEUTROPHILS NFR BLD AUTO: 2.77 10*3/MM3 (ref 1.7–7)
NEUTROPHILS NFR BLD AUTO: 48.2 % (ref 42.7–76)
NRBC BLD AUTO-RTO: 0.2 /100 WBC (ref 0–0.2)
PERCENT MAX PREDICTED HR: 71.11 %
PLATELET # BLD AUTO: 186 10*3/MM3 (ref 140–450)
PMV BLD AUTO: 11.1 FL (ref 6–12)
POTASSIUM SERPL-SCNC: 3.8 MMOL/L (ref 3.5–5.2)
RBC # BLD AUTO: 3.62 10*6/MM3 (ref 3.77–5.28)
SINUS: 2.7 CM
SODIUM SERPL-SCNC: 141 MMOL/L (ref 136–145)
STRESS BASELINE BP: NORMAL MMHG
STRESS BASELINE HR: 79 BPM
STRESS PERCENT HR: 84 %
STRESS POST PEAK BP: NORMAL MMHG
STRESS POST PEAK HR: 96 BPM
STRESS TARGET HR: 115 BPM
WBC NRBC COR # BLD: 5.75 10*3/MM3 (ref 3.4–10.8)

## 2021-12-27 PROCEDURE — 0 TECHNETIUM SESTAMIBI: Performed by: HOSPITALIST

## 2021-12-27 PROCEDURE — G0378 HOSPITAL OBSERVATION PER HR: HCPCS

## 2021-12-27 PROCEDURE — 25010000002 REGADENOSON 0.4 MG/5ML SOLUTION: Performed by: HOSPITALIST

## 2021-12-27 PROCEDURE — 85025 COMPLETE CBC W/AUTO DIFF WBC: CPT | Performed by: NURSE PRACTITIONER

## 2021-12-27 PROCEDURE — 78452 HT MUSCLE IMAGE SPECT MULT: CPT

## 2021-12-27 PROCEDURE — 93017 CV STRESS TEST TRACING ONLY: CPT

## 2021-12-27 PROCEDURE — 93246 EXT ECG>7D<15D RECORDING: CPT

## 2021-12-27 PROCEDURE — 80048 BASIC METABOLIC PNL TOTAL CA: CPT | Performed by: NURSE PRACTITIONER

## 2021-12-27 PROCEDURE — 36415 COLL VENOUS BLD VENIPUNCTURE: CPT | Performed by: NURSE PRACTITIONER

## 2021-12-27 PROCEDURE — 99213 OFFICE O/P EST LOW 20 MIN: CPT | Performed by: NURSE PRACTITIONER

## 2021-12-27 PROCEDURE — A9500 TC99M SESTAMIBI: HCPCS | Performed by: HOSPITALIST

## 2021-12-27 RX ORDER — DILTIAZEM HYDROCHLORIDE 120 MG/1
120 CAPSULE, COATED, EXTENDED RELEASE ORAL
Qty: 30 CAPSULE | Refills: 0 | Status: SHIPPED | OUTPATIENT
Start: 2021-12-28 | End: 2022-01-24 | Stop reason: SDUPTHER

## 2021-12-27 RX ORDER — DILTIAZEM HYDROCHLORIDE 120 MG/1
120 CAPSULE, COATED, EXTENDED RELEASE ORAL
Status: DISCONTINUED | OUTPATIENT
Start: 2021-12-27 | End: 2021-12-27 | Stop reason: HOSPADM

## 2021-12-27 RX ADMIN — TECHNETIUM TC 99M SESTAMIBI 1 DOSE: 1 INJECTION INTRAVENOUS at 06:10

## 2021-12-27 RX ADMIN — REGADENOSON 0.4 MG: 0.08 INJECTION, SOLUTION INTRAVENOUS at 08:05

## 2021-12-27 RX ADMIN — SODIUM CHLORIDE, PRESERVATIVE FREE 10 ML: 5 INJECTION INTRAVENOUS at 10:07

## 2021-12-27 RX ADMIN — TECHNETIUM TC 99M SESTAMIBI 1 DOSE: 1 INJECTION INTRAVENOUS at 08:05

## 2021-12-27 RX ADMIN — ROSUVASTATIN CALCIUM 10 MG: 10 TABLET, FILM COATED ORAL at 10:08

## 2021-12-27 RX ADMIN — DILTIAZEM HYDROCHLORIDE 120 MG: 120 CAPSULE, COATED, EXTENDED RELEASE ORAL at 12:49

## 2021-12-27 NOTE — PROGRESS NOTES
"    Patient Name: Jia Gillis  :1935  86 y.o.      Patient Care Team:  Wyatt Ramos MD as PCP - General (Family Medicine)  Connor Medrano MD as Consulting Physician (Hematology and Oncology)    Chief Complaint: follow up murmur, PVCs    Interval History: PVCs noted on monitor. Had fatigue with beta blocker. Stress and echo done this morning.        Objective   Vital Signs  Temp:  [97.9 °F (36.6 °C)-98 °F (36.7 °C)] 97.9 °F (36.6 °C)  Heart Rate:  [65-66] 66  Resp:  [16-18] 18  BP: (158-165)/(67-83) 165/67    Intake/Output Summary (Last 24 hours) at 2021 1339  Last data filed at 2021 0600  Gross per 24 hour   Intake 1950 ml   Output --   Net 1950 ml     Flowsheet Rows      First Filed Value   Admission Height 157.5 cm (62\") Documented at 2021 0405   Admission Weight 60.8 kg (134 lb) Documented at 2021 0405          Physical Exam:   General Appearance:    Alert, cooperative, in no acute distress   Lungs:     Clear to auscultation.  Normal respiratory effort and rate.      Heart:    Regular rhythm and normal rate, normal S1 and S2, no murmurs, gallops or rubs.     Chest Wall:    No abnormalities observed   Abdomen:     Soft, nontender, positive bowel sounds.     Extremities:   no cyanosis, clubbing or edema.  No marked joint deformities.  Adequate musculoskeletal strength.       Results Review:    Results from last 7 days   Lab Units 21  0541   SODIUM mmol/L 141   POTASSIUM mmol/L 3.8   CHLORIDE mmol/L 108*   CO2 mmol/L 23.2   BUN mg/dL 21   CREATININE mg/dL 0.92   GLUCOSE mg/dL 89   CALCIUM mg/dL 8.2*     Results from last 7 days   Lab Units 21  0022   TROPONIN T ng/mL <0.010     Results from last 7 days   Lab Units 21  0541   WBC 10*3/mm3 5.75   HEMOGLOBIN g/dL 10.6*   HEMATOCRIT % 31.3*   PLATELETS 10*3/mm3 186     Results from last 7 days   Lab Units 21  1135   INR  0.98     Results from last 7 days   Lab Units 21  1135   MAGNESIUM mg/dL 2.2 "                   Medication Review:   dilTIAZem CD, 120 mg, Oral, Q24H  rosuvastatin, 10 mg, Oral, Daily  sodium chloride, 10 mL, Intravenous, Q12H         sodium chloride, 100 mL/hr, Last Rate: Stopped (12/27/21 1244)        Assessment/Plan   1. Frequent PVCs. Likely increasing in burden. Stop beta blocker (fatigue and not effective). Start diltiazem. She takes amlodipine/benazepril which will need to be stopped. Monitor BP and restart benazepril as outpatient if needed. Continue HCTZ.  No ischemia on stress. Zio patch at discharge.   2. Murmur - no significant valve disease noted on echo. Mild mitral valve calcification.   3. Hypertension - med adjustment as above. Follow closely as outpatient.   4. Near syncope/bradycardia - as above.   5. chronic kidney disease.     Ok for dc. Follow up in office in 4 weeks.     SALINAS Dillon  [facility]  12/27/21  13:39 EST

## 2021-12-27 NOTE — PLAN OF CARE
Problem: Adult Inpatient Plan of Care  Goal: Plan of Care Review  Outcome: Met  Flowsheets (Taken 12/27/2021 1341)  Progress: improving  Plan of Care Reviewed With: patient  Outcome Summary: Vitals stable. stress test and echo done today. pt cleared to discharge to home with Zio patch heart monitor. Rx, instructions, and follow ups provided.

## 2021-12-27 NOTE — NURSING NOTE
S/w Janeen Mao RN with [facility] - pt okay to discharge - needs to have Zio patch placed at discharge   Dr Bauer notified.

## 2021-12-27 NOTE — PLAN OF CARE
Goal Outcome Evaluation:  Plan of Care Reviewed With: patient           Outcome Summary: VSS afebrile. Pt independent and pain free. NPO since midnight for stress test today, ECHO still pending. Uneventful night, will continue to monitor.

## 2021-12-27 NOTE — DISCHARGE SUMMARY
[facility]    Date of Discharge:  12/27/2021    PCP: Wyatt Ramos MD    Discharge Diagnosis:   Active Hospital Problems    Diagnosis  POA   • **Near syncope [R55]  Yes   • HLD (hyperlipidemia) [E78.5]  Yes   • HTN (hypertension) [I10]  Yes   • Stage 3b chronic kidney disease (HCC) [N18.32]  Yes   • Palpitations [R00.2]  Yes   • Hyperglycemia [R73.9]  Yes   • Frequent PVCs [I49.3]  Yes      Resolved Hospital Problems   No resolved problems to display.          Consults     Date and Time Order Name Status Description    12/26/2021  4:17 AM Inpatient Cardiology Consult Completed     12/26/2021  3:23 AM [facility] (on-call MD unless specified) Details          Hospital Course  86 y.o. female with past medical history notable for hypertension, mixed hyperlipidemia, hypothyroidism, breast cancer, and PVCs who presents to the emergency room after presyncopal episode yesterday evening. He was having fairly frequent PVC episodes and cardiology felt her PVC burdens were increasing. Cardiology started diltiazem and stopped amlodipine/benazepril. They also stopped her beta-blocker as they felt that was not really helping her symptoms.    Results for orders placed during the hospital encounter of 12/26/21    Adult Transthoracic Echo Complete W/ Cont if Necessary Per Protocol    Interpretation Summary  · Estimated left ventricular EF = 70% Left ventricular systolic function is normal.  · Left ventricular diastolic function was normal.     Today her symptoms have resolved and she would like to go home. Cardiology recommends that she go home on a ZIO. They would like to see her in 4 weeks.    I discussed the patient's findings and my recommendations with patient, family and nursing staff.    Condition on Discharge: Improved.     Temp:  [97.9 °F (36.6 °C)-98.1 °F (36.7 °C)] 98.1 °F (36.7 °C)  Heart Rate:  [65-66] 66  Resp:  [16-18] 18  BP: (158-165)/(67-83) 165/69  Body mass  index is 24.51 kg/m².    Physical Exam  Constitutional:       General: She is not in acute distress.     Appearance: Normal appearance. She is not toxic-appearing.   HENT:      Head: Normocephalic and atraumatic.      Right Ear: External ear normal.      Left Ear: External ear normal.      Nose: Nose normal.   Eyes:      Conjunctiva/sclera: Conjunctivae normal.   Cardiovascular:      Rate and Rhythm: Normal rate and regular rhythm.   Pulmonary:      Effort: Pulmonary effort is normal. No respiratory distress.      Breath sounds: Normal breath sounds. No wheezing or rhonchi.   Abdominal:      General: Bowel sounds are normal. There is no distension.      Palpations: Abdomen is soft.      Tenderness: There is no abdominal tenderness. There is no guarding or rebound.   Musculoskeletal:         General: No swelling.      Right lower leg: No edema.      Left lower leg: No edema.   Skin:     General: Skin is warm and dry.   Neurological:      General: No focal deficit present.      Mental Status: She is alert and oriented to person, place, and time.   Psychiatric:         Mood and Affect: Mood normal.         Behavior: Behavior normal.         Thought Content: Thought content normal.     While in the room and during my examination of the patient I wore gloves, mask, eye protection.  I washed my hands before and after this patient encounter.     Disposition: Home or Self Care       Discharge Medications      New Medications      Instructions Start Date   dilTIAZem  MG 24 hr capsule  Commonly known as: CARDIZEM CD   120 mg, Oral, Every 24 Hours Scheduled   Start Date: December 28, 2021        Continue These Medications      Instructions Start Date   baclofen 10 MG tablet  Commonly known as: LIORESAL   10 mg, Oral, 3 Times Daily PRN      Biotin 5 MG tablet dispersible   1 tablet, Oral, Daily      CALCIUM 600 PO   1 tablet, Oral, Daily      cetirizine 10 MG tablet  Commonly known as: zyrTEC   1 tablet, Oral, Daily PRN       hydroCHLOROthiazide 25 MG tablet  Commonly known as: HYDRODIURIL   1 tablet, Oral, Daily      rosuvastatin 10 MG tablet  Commonly known as: CRESTOR   1 tablet, Oral, Daily         Stop These Medications    amLODIPine-benazepril 10-40 MG per capsule  Commonly known as: LOTREL     metoprolol succinate XL 25 MG 24 hr tablet  Commonly known as: TOPROL-XL           Diet Instructions     Diet: Regular      Discharge Diet: Regular         Activity Instructions     Activity as Tolerated           Additional Instructions for the Follow-ups that You Need to Schedule     Call MD for problems / concerns.   As directed         Follow-up Information     Wyatt Ramos MD. Schedule an appointment as soon as possible for a visit in 2 week(s).    Specialty: Family Medicine  Contact information:  [address] [facility] [address] [phone]             Belkys Burton MD. Schedule an appointment as soon as possible for a visit in 4 week(s).    Specialty: Cardiology  Contact information:  [address] [facility] [address]  [facility] [address] [phone]                           [facility] Quintin Bauer MD  12/27/21  13:56 EST    Discharge time spent greater than 30 minutes.

## 2021-12-27 NOTE — PROGRESS NOTES
Discharge Planning Assessment  [facility]     Patient Name: Jia Gillis  MRN: [id number]  Today's Date: 12/27/2021    Admit Date: 12/26/2021     Discharge Needs Assessment    No documentation.                Discharge Plan     Row Name 12/27/21 1353       Plan    Plan Home    Final Discharge Disposition Code 01 - home or self-care    Final Note Home              Continued Care and Services - Admitted Since 12/26/2021    Coordination has not been started for this encounter.       Expected Discharge Date and Time     Expected Discharge Date Expected Discharge Time    Dec 27, 2021          Demographic Summary    No documentation.                Functional Status    No documentation.                Psychosocial    No documentation.                Abuse/Neglect    No documentation.                Legal    No documentation.                Substance Abuse    No documentation.                Patient Forms    No documentation.                   Teetee Brandon RN

## 2022-01-17 ENCOUNTER — TELEPHONE (OUTPATIENT)
Dept: CARDIOLOGY | Facility: CLINIC | Age: 87
End: 2022-01-17

## 2022-01-17 LAB
MAXIMAL PREDICTED HEART RATE: 135 BPM
STRESS TARGET HR: 115 BPM

## 2022-01-17 PROCEDURE — 93248 EXT ECG>7D<15D REV&INTERPJ: CPT | Performed by: INTERNAL MEDICINE

## 2022-01-17 NOTE — TELEPHONE ENCOUNTER
Pt calling stating she has appt next week and was recently in the hospital for    **Near syncope [R55]   Yes   • HLD (hyperlipidemia) [E78.5]   Yes   • HTN (hypertension) [I10]   Yes   • Stage 3b chronic kidney disease (HCC) [N18.32]   Yes   • Palpitations [R00.2]   Yes   • Hyperglycemia [R73.9]   Yes   • Frequent PVCs [I49.3]       . Now c/o HBP at 195/80 three days ago. Called her PCP and he started her on ramipril 5mg qd  which has brought the BP down. Wondering if this is ok and if she needs to do anything else. Please advise.    DD

## 2022-01-17 NOTE — TELEPHONE ENCOUNTER
Notified pt. She verbalized understanding.    Thank you,    Cuca Hugo, RN  Triage [facility]

## 2022-01-24 ENCOUNTER — OFFICE VISIT (OUTPATIENT)
Dept: CARDIOLOGY | Facility: CLINIC | Age: 87
End: 2022-01-24

## 2022-01-24 VITALS
HEIGHT: 62 IN | DIASTOLIC BLOOD PRESSURE: 58 MMHG | WEIGHT: 127 LBS | BODY MASS INDEX: 23.37 KG/M2 | SYSTOLIC BLOOD PRESSURE: 180 MMHG | HEART RATE: 67 BPM

## 2022-01-24 DIAGNOSIS — R00.2 PALPITATIONS: ICD-10-CM

## 2022-01-24 DIAGNOSIS — R01.1 HEART MURMUR: ICD-10-CM

## 2022-01-24 DIAGNOSIS — I10 PRIMARY HYPERTENSION: ICD-10-CM

## 2022-01-24 DIAGNOSIS — F51.02 ADJUSTMENT INSOMNIA: ICD-10-CM

## 2022-01-24 DIAGNOSIS — F41.9 ANXIETY: ICD-10-CM

## 2022-01-24 DIAGNOSIS — I49.3 PVCS (PREMATURE VENTRICULAR CONTRACTIONS): Primary | ICD-10-CM

## 2022-01-24 DIAGNOSIS — I35.8 AORTIC VALVE SCLEROSIS: ICD-10-CM

## 2022-01-24 PROCEDURE — 99214 OFFICE O/P EST MOD 30 MIN: CPT | Performed by: NURSE PRACTITIONER

## 2022-01-24 PROCEDURE — 93000 ELECTROCARDIOGRAM COMPLETE: CPT | Performed by: NURSE PRACTITIONER

## 2022-01-24 RX ORDER — RAMIPRIL 5 MG/1
1 CAPSULE ORAL DAILY
COMMUNITY
End: 2022-02-14 | Stop reason: SDUPTHER

## 2022-01-24 RX ORDER — DILTIAZEM HYDROCHLORIDE 240 MG/1
240 CAPSULE, COATED, EXTENDED RELEASE ORAL
Qty: 90 CAPSULE | Refills: 0 | Status: SHIPPED | OUTPATIENT
Start: 2022-01-24 | End: 2022-03-15

## 2022-01-24 NOTE — PROGRESS NOTES
Date of Office Visit: 2022  Encounter Provider: SALINAS Haynes  Place of Service: [facility] CARDIOLOGY  Patient Name: Jia Gillis  :1935  Primary Cardiologist: Dr. Belkys Burton    Chief Complaint   Patient presents with   • Hospital Follow Up Visit   • PVC   :     HPI: Jia Gillis is a pleasant 86 y.o. female who presents today for evaluation of PVCs.  She is a new patient to me and I reviewed her medical records.    She has been diagnosed with hypertension, hyperlipidemia, and thyroid disease.  In the , she was diagnosed with breast cancer and underwent right-sided lumpectomy.  In , she had recurrence on the right side and underwent lumpectomy, radiation therapy, and received tamoxifen.  In , she developed breast cancer on the left side and underwent left-sided lumpectomy.    In 2015, echocardiogram showed normal LVEF and aortic valve sclerosis.  In 2019, she saw Dr. Belkys Burton for evaluation of chest pressure and palpitations.  She was noted to have a heart murmur and echocardiogram was normal.  Holter monitor showed normal sinus rhythm with frequent PVCs with a 3.6% burden and one episode of SVT 6 beats in duration.    In 2021, she presented to the [facility] emergency department for evaluation of lightheadedness and near syncope.  She reported that 6 months prior she was experiencing more palpitations her PCP started her on metoprolol succinate 25 mg daily.  She felt worse after starting the medications and felt like her palpitations were stronger and more prominent.  Her metoprolol was decreased to 12.5 mg twice per day and her palpitations did not improve.  She also noticed when she had a glass of wine that this would increase her palpitations.      Dr. Armaan Singleton consulted on her during the hospitalization.  Her thyroid electrolytes were normal.  Myocardial perfusion study showed no evidence of ischemia  and unifocal PVCs stayed the same during the entire test.  Echocardiogram showed normal LVEF, aortic valve sclerosis, and mild mitral annular calcification.  She was discharged on a 14-day Holter monitor which showed normal sinus rhythm, average heart rate 59, and a total PVC burden of 11.1%.  She had 3 episodes of nonsustained SVT the longest 11 beats in duration and the fastest at a rate of 158.  Her metoprolol was discontinued and she was switched to diltiazem.    Today is her follow-up visit with her daughter accompanying her.  She was recommended to start checking her blood pressure and heart rate at home post hospitalization.  This has created more anxiety for her because her blood pressure has been elevated.  Her PCP recently started her on ramipril 5 mg daily in addition to the diltiazem 120 mg she is already taking.  She continues to experience palpitations all the time and occasional lightheadedness with quick positional changes.  She denies chest pain, shortness of air, edema, syncope, or bleeding.  Her blood pressure remains elevated and heart rate well controlled.      Past Medical History:   Diagnosis Date   • Aortic valve sclerosis    • Heart murmur    • Hyperlipidemia    • Hypertension    • Malignant tumor of breast (HCC)    • Palpitations    • PVCs (premature ventricular contractions)        Past Surgical History:   Procedure Laterality Date   • BREAST BIOPSY     • BREAST SURGERY     • THYROID SURGERY         Social History     Socioeconomic History   • Marital status:    Tobacco Use   • Smoking status: Never Smoker   • Smokeless tobacco: Never Used   • Tobacco comment: caffeine use   Substance and Sexual Activity   • Alcohol use: Not Currently     Alcohol/week: 0.0 standard drinks     Comment: wine twice monthly...social   • Drug use: Never   • Sexual activity: Not Currently     Partners: Male       Family History   Problem Relation Age of Onset   • Heart disease Mother    • Hypertension  "Mother    • Hypertension Father    • Cancer Father    • Heart attack Sister    • Diabetes Son        The following portion of the patient's history were reviewed and updated as appropriate: past medical history, past surgical history, past social history, past family history, allergies, current medications, and problem list.    Review of Systems   Constitutional: Negative.   Cardiovascular: Positive for palpitations.   Respiratory: Negative.    Hematologic/Lymphatic: Negative.    Musculoskeletal: Positive for myalgias.   Neurological: Positive for light-headedness.       No Known Allergies      Current Outpatient Medications:   •  cetirizine (zyrTEC) 10 MG tablet, Take 1 tablet by mouth Daily As Needed., Disp: , Rfl:   •  dilTIAZem CD (CARDIZEM CD) 120 MG 24 hr capsule, Take 1 capsule by mouth Daily., Disp: 90 capsule, Rfl: 0  •  hydroCHLOROthiazide (HYDRODIURIL) 25 MG tablet, Take 1 tablet by mouth Daily., Disp: , Rfl:   •  ramipril (ALTACE) 5 MG capsule, Take 1 capsule by mouth Daily., Disp: , Rfl:   •  rosuvastatin (CRESTOR) 10 MG tablet, Take 1 tablet by mouth Daily., Disp: , Rfl:         Objective:     Vitals:    01/24/22 1316 01/24/22 1336   BP: 178/50 180/58   BP Location: Left arm Right arm   Pulse: 67    Weight: 57.6 kg (127 lb)    Height: 157.5 cm (62\")      Body mass index is 23.23 kg/m².    PHYSICAL EXAM:    Vitals Reviewed.   General Appearance: No acute distress, well developed and well nourished. Thin.    Eyes: Conjunctiva and lids: No erythema, swelling, or discharge. Sclera non-icteric. Glasses.   HENT: Atraumatic, normocephalic. External eyes, ears, and nose normal. No hearing loss noted. Mucous membranes normal. Lips not cyanotic. Neck supple with no tenderness. Wearing mask.   Respiratory: No signs of respiratory distress. Respiration rhythm and depth normal.   Clear to auscultation. No rales, crackles, rhonchi, or wheezing auscultated.   Cardiovascular:  Jugular Venous Pressure: Normal  Heart " Rate and Rhythm: Normal rhythm.  Ectopy.  Heart Sounds: Normal S1 and S2. No S3 or S4 noted.  Murmurs: No murmurs noted. No rubs, thrills, or gallops.   Lower Extremities: No edema noted.  Gastrointestinal:  Abdomen soft, non-distended, non-tender.    Musculoskeletal: Normal movement of extremities.  Skin and Nails: General appearance normal. No pallor, cyanosis, diaphoresis. Skin temperature normal. No clubbing of fingernails.   Psychiatric: Patient alert and oriented to person, place, and time. Speech and behavior appropriate. Normal mood and affect.       ECG 12 Lead    Date/Time: 1/24/2022 1:21 PM  Performed by: Ninfa Goldman APRN  Authorized by: Ninfa Goldman APRN   Comparison: compared with previous ECG from 12/26/2021  Similar to previous ECG  Rhythm: sinus rhythm  Ectopy: unifocal PVCs  Rate: normal  BPM: 67  Conduction: conduction normal  ST Segments: ST segments normal  T Waves: T waves normal  QRS axis: normal    Clinical impression: abnormal EKG              Assessment:       Diagnosis Plan   1. PVCs (premature ventricular contractions)     2. Palpitations     3. Primary hypertension     4. Heart murmur     5. Aortic valve sclerosis     6. Anxiety     7. Adjustment insomnia            Plan:       1/2. PVC: Recently symptomatic with palpitations and noted to have an 11% PVC burden on Holter monitor.  She continues to experience the palpitations.  I recommended increasing diltiazem to 240 mg 1 tablet daily in the morning and taking magnesium 400 mg 1 tablet before bedtime at nighttime.  We discussed avoiding stimulants and decongestants.  I have also encouraged 64 ounces of water daily.    3.  Hypertension: She was diagnosed with hypertension at age 51.  She was previously on amlodipine 10 mg-benazepril 40 mg 1 tablet daily.  This medication was discontinued during her recent hospitalization and her PCP recently started her on ramipril 5 mg daily.  Goal blood pressure 120s/80s or less recommended.   I recommend starting with the increase of diltiazem and we may need to further increase her ramipril.    4/5.  Heart Murmur: Mild murmur noted from aortic sclerosis per recent echocardiogram.    6.  Anxiety: I recommended regular exercise, meditation, enjoyable hobbies, and talking with her PCP.    7.  Insomnia: Hopefully with adding the magnesium this will help.    8.  I recommended a 1 month follow-up visit with Dr. Burton in [place].      As always, it has been a pleasure to participate in your patient's care. Thank you.       Sincerely,         SALINAS Cadena  [facility] Cardiology      · Dictated utilizing Dragon Dictation  · COVID-19 Precautions - Patient was compliant in wearing a mask. When I saw the patient, I used appropriate personal protective equipment (PPE) including mask and eye shield (standard procedure).  Additionally, I used gown and gloves if indicated.  Hand hygiene was completed before and after seeing the patient.  · I spent 43 minutes reviewing her medical records/testing/previous office notes/labs, face-to-face interaction with patient, physical examination, formulating the plan of care, and discussion of plan of care with patient.

## 2022-01-24 NOTE — PATIENT INSTRUCTIONS
Increase diltiazem to 240 mg daily in the morning  Take magnesium 400 mg 1 tablet at nighttime  Increase water to 64 ounces daily  Call with further concerns  Follow-up with Dr. Burton in 1 month at the [facility]

## 2022-02-14 ENCOUNTER — TELEPHONE (OUTPATIENT)
Dept: CARDIOLOGY | Facility: CLINIC | Age: 87
End: 2022-02-14

## 2022-02-14 RX ORDER — RAMIPRIL 10 MG/1
10 CAPSULE ORAL DAILY
Qty: 30 CAPSULE | Refills: 1 | Status: SHIPPED | OUTPATIENT
Start: 2022-02-14 | End: 2022-02-15 | Stop reason: SDUPTHER

## 2022-02-14 NOTE — TELEPHONE ENCOUNTER
Increase from ramipril to 10 mg daily.  Continue to monitor and call with an update in a couple of weeks.

## 2022-02-14 NOTE — TELEPHONE ENCOUNTER
Spoke with pt, she states he BP is still elevated despite recent medication change. Reading 2 hrs after medication was 168/73 but had not been resting. Called back with reading after resting for at least 30 minutes and it was. 186/69.    dd

## 2022-02-15 RX ORDER — RAMIPRIL 10 MG/1
10 CAPSULE ORAL DAILY
Qty: 90 CAPSULE | Refills: 0 | Status: SHIPPED | OUTPATIENT
Start: 2022-02-15 | End: 2022-03-15

## 2022-02-15 NOTE — TELEPHONE ENCOUNTER
Pt reporting bp today of 193/74, message relayed. Will double 5mg tablets until she runs out so she doesn't waste them and will  her refill then. Will call back in two weeks with bp log.      dd

## 2022-02-16 NOTE — TELEPHONE ENCOUNTER
Thank you for message.     Since her BP was so high today, can you call and check on her next week? Thanks.

## 2022-02-22 NOTE — TELEPHONE ENCOUNTER
I would recommend increasing the ramipril to 20 mg daily. Please arrange follow-up with me in 2 weeks and have her bring her blood pressure machine so that we can double check it for accuracy.  Thank you

## 2022-02-22 NOTE — TELEPHONE ENCOUNTER
Spoke with pt at day 6-7 she states BP is still high at about 160-170, she notices her nerves have affected it on one occasion. sleep seems to be better since adding magnesium. But has increased urination since increasing water. Informed pt to keep daily bp log and call back on Friday(per two week call back instructions) we want to give medication time to go into effect.    dd

## 2022-02-22 NOTE — TELEPHONE ENCOUNTER
Pt coming to see  on the march 14th so she will keep that appt. And just call with her readings in two weeks.    dd

## 2022-03-15 ENCOUNTER — OFFICE VISIT (OUTPATIENT)
Dept: CARDIOLOGY | Facility: CLINIC | Age: 87
End: 2022-03-15

## 2022-03-15 VITALS
DIASTOLIC BLOOD PRESSURE: 80 MMHG | HEART RATE: 73 BPM | SYSTOLIC BLOOD PRESSURE: 188 MMHG | WEIGHT: 130 LBS | BODY MASS INDEX: 23.92 KG/M2 | OXYGEN SATURATION: 99 % | HEIGHT: 62 IN

## 2022-03-15 DIAGNOSIS — I35.8 AORTIC VALVE SCLEROSIS: Primary | ICD-10-CM

## 2022-03-15 DIAGNOSIS — E78.2 MIXED HYPERLIPIDEMIA: ICD-10-CM

## 2022-03-15 DIAGNOSIS — I10 PRIMARY HYPERTENSION: ICD-10-CM

## 2022-03-15 DIAGNOSIS — I49.3 PVCS (PREMATURE VENTRICULAR CONTRACTIONS): ICD-10-CM

## 2022-03-15 PROCEDURE — 93000 ELECTROCARDIOGRAM COMPLETE: CPT | Performed by: INTERNAL MEDICINE

## 2022-03-15 PROCEDURE — 99214 OFFICE O/P EST MOD 30 MIN: CPT | Performed by: INTERNAL MEDICINE

## 2022-03-15 RX ORDER — DILTIAZEM HYDROCHLORIDE 300 MG/1
300 CAPSULE, COATED, EXTENDED RELEASE ORAL EVERY MORNING
Qty: 90 CAPSULE | Refills: 3 | Status: SHIPPED | OUTPATIENT
Start: 2022-03-15 | End: 2022-03-15 | Stop reason: SDUPTHER

## 2022-03-15 RX ORDER — DILTIAZEM HYDROCHLORIDE 300 MG/1
300 CAPSULE, COATED, EXTENDED RELEASE ORAL EVERY MORNING
Qty: 90 CAPSULE | Refills: 3 | Status: SHIPPED | OUTPATIENT
Start: 2022-03-15 | End: 2023-03-01

## 2022-03-15 RX ORDER — RAMIPRIL 10 MG/1
20 CAPSULE ORAL NIGHTLY
Qty: 180 CAPSULE | Refills: 3 | Status: SHIPPED | OUTPATIENT
Start: 2022-03-15 | End: 2022-03-16 | Stop reason: SDUPTHER

## 2022-03-15 NOTE — PROGRESS NOTES
Date of Office Visit: 03/15/2022  Encounter Provider: Belkys Burton MD  Place of Service: [facility] CARDIOLOGY  Patient Name: Jia Gillis  :1935      Patient ID:  Jia Gillis is a 86 y.o. female is here for  followup for hypertension        History of Present Illness    She has a history of hypertension, hyperlipidemia, thyroid disease, history of breast cancer with radiation therapy.  Her breast cancer was first diagnosed in the  on the right side and she had a lumpectomy.  She had a recurrence in  on the right side and had a lumpectomy with radiation therapy and tamoxifen.  She then had a recurrence of breast cancer on the left side in  with a left-sided lumpectomy and no adjuvant therapy.     She is , has 3 children and is retired.  She uses no cigarettes and has social alcohol.  She has 4 cups of coffee per day.  Her sister had a myocardial infarction at the age of 40.  Her mother heart disease and hypertension.  Her son has diabetes.    In 2021, she presented to the [facility] emergency department for evaluation of lightheadedness and near syncope.  She reported that 6 months prior she was experiencing more palpitations her PCP started her on metoprolol succinate 25 mg daily.  She felt worse after starting the medications and felt like her palpitations were stronger and more prominent.  Her metoprolol was decreased to 12.5 mg twice per day and her palpitations did not improve.  She also noticed when she had a glass of wine that this would increase her palpitations.       Dr. Armaan Singleton consulted on her during the hospitalization.  Her thyroid and electrolytes were normal.  Myocardial perfusion study showed no evidence of ischemia and unifocal PVCs stayed the same during the entire test.  Echocardiogram showed normal LVEF, no LVH or diastolic dysfunction, aortic valve sclerosis, and mild mitral annular calcification.  She was  discharged on a 14-day Holter monitor which showed normal sinus rhythm, average heart rate 59, and a total PVC burden of 11.1%.  She had 3 episodes of nonsustained SVT the longest 11 beats in duration and the fastest at a rate of 158.  Her metoprolol was discontinued and she was switched to diltiazem.     She has no headache, visual changes.  I rechecked her blood pressure in the office today and got 192/62.  She does feel her heart pounding and skipping because the PVCs.  She has no chest pain or pressure.  She has no orthopnea or PND.  She has no exertional dyspnea.  She is taking her diltiazem and hydrochlorothiazide in the morning and a ramipril and rosuvastatin at night.  Overall, she is feeling pretty well.    Past Medical History:   Diagnosis Date   • Aortic valve sclerosis    • Heart murmur    • Hyperlipidemia    • Hypertension    • Malignant tumor of breast (HCC)    • Palpitations    • PVCs (premature ventricular contractions)          Past Surgical History:   Procedure Laterality Date   • BREAST BIOPSY     • BREAST SURGERY     • THYROID SURGERY         Current Outpatient Medications on File Prior to Visit   Medication Sig Dispense Refill   • dilTIAZem CD (CARDIZEM CD) 240 MG 24 hr capsule Take 1 capsule by mouth Daily. 90 capsule 0   • hydroCHLOROthiazide (HYDRODIURIL) 25 MG tablet Take 1 tablet by mouth Daily.     • ramipril (ALTACE) 10 MG capsule Take 1 capsule by mouth Daily. (Patient taking differently: Take 20 mg by mouth Daily.) 90 capsule 0   • rosuvastatin (CRESTOR) 10 MG tablet Take 1 tablet by mouth Daily.     • cetirizine (zyrTEC) 10 MG tablet Take 1 tablet by mouth Daily As Needed.       No current facility-administered medications on file prior to visit.       Social History     Socioeconomic History   • Marital status:    Tobacco Use   • Smoking status: Never Smoker   • Smokeless tobacco: Never Used   • Tobacco comment: caffeine use   Substance and Sexual Activity   • Alcohol use: Not  "Currently     Alcohol/week: 0.0 standard drinks     Comment: wine twice monthly...social   • Drug use: Never   • Sexual activity: Not Currently     Partners: Male           ROS    Procedures    ECG 12 Lead    Date/Time: 3/15/2022 12:51 PM  Performed by: Belkys Burton MD  Authorized by: Belkys Burton MD   Comparison: compared with previous ECG   Similar to previous ECG  Rhythm: sinus rhythm  Ectopy: unifocal PVCs    Clinical impression: abnormal EKG                Objective:      Vitals:    03/15/22 1237 03/15/22 1241   BP: (!) 200/68 (!) 188/80   Pulse: 73    SpO2: 99%    Weight: 59 kg (130 lb)    Height: 157.5 cm (62\")      Body mass index is 23.78 kg/m².    Vitals reviewed.   Constitutional:       General: Not in acute distress.     Appearance: Well-developed. Not diaphoretic.   Eyes:      General: No scleral icterus.     Conjunctiva/sclera: Conjunctivae normal.   HENT:      Head: Normocephalic and atraumatic.   Neck:      Thyroid: No thyromegaly.      Vascular: No carotid bruit or JVD.      Lymphadenopathy: No cervical adenopathy.   Pulmonary:      Effort: Pulmonary effort is normal. No respiratory distress.      Breath sounds: Normal breath sounds. No wheezing. No rhonchi. No rales.   Chest:      Chest wall: Not tender to palpatation.   Cardiovascular:      Normal rate. Regularly irregular rhythm.      Murmurs: There is no murmur.      No gallop.   Pulses:     Intact distal pulses.   Edema:     Peripheral edema absent.   Abdominal:      General: Bowel sounds are normal. There is no distension or abdominal bruit.      Palpations: Abdomen is soft. There is no abdominal mass.      Tenderness: There is no abdominal tenderness.   Musculoskeletal:         General: No deformity.      Extremities: No clubbing present.     Cervical back: Neck supple. Skin:     General: Skin is warm and dry. There is no cyanosis.      Coloration: Skin is not pale.      Findings: No rash.   Neurological:      Mental Status: " Alert and oriented to person, place, and time.      Cranial Nerves: No cranial nerve deficit.   Psychiatric:         Judgment: Judgment normal.         Lab Review:       Assessment:      Diagnosis Plan   1. Aortic valve sclerosis     2. Mixed hyperlipidemia     3. Primary hypertension     4. PVCs (premature ventricular contractions)       1. Hypertension, goal less than 120/80.    Her blood pressure checks at home are mid 140s.  Increase diltiazem to 300 mg in the morning and maintain her other current medications.  2. Hyperlipidemia, on rosuvastatin.  3. PVCs, on diltiazem for this  4. H/o breast cancer with radiation.           Plan:       See Misael in 6 weeks in [place].  We will continue to adjust medications to treat her blood pressure.

## 2022-03-16 RX ORDER — RAMIPRIL 10 MG/1
20 CAPSULE ORAL NIGHTLY
Qty: 180 CAPSULE | Refills: 3 | Status: SHIPPED | OUTPATIENT
Start: 2022-03-16 | End: 2023-03-14 | Stop reason: SDUPTHER

## 2022-03-16 NOTE — TELEPHONE ENCOUNTER
Patient called and wanted to know if we could change the pill to a 20 mg instead of 2 10 mg tablets (ramipril).  Advised patient that the it only comes in 10 mg tablets.  Patient verbalized understanding.    Sent in new RX being it said that it was called in.    Jackie

## 2022-04-26 ENCOUNTER — OFFICE VISIT (OUTPATIENT)
Dept: CARDIOLOGY | Facility: CLINIC | Age: 87
End: 2022-04-26

## 2022-04-26 VITALS
SYSTOLIC BLOOD PRESSURE: 140 MMHG | WEIGHT: 126 LBS | OXYGEN SATURATION: 97 % | DIASTOLIC BLOOD PRESSURE: 70 MMHG | HEIGHT: 62 IN | RESPIRATION RATE: 16 BRPM | HEART RATE: 64 BPM | BODY MASS INDEX: 23.19 KG/M2

## 2022-04-26 DIAGNOSIS — R01.1 HEART MURMUR: ICD-10-CM

## 2022-04-26 DIAGNOSIS — I10 PRIMARY HYPERTENSION: Primary | ICD-10-CM

## 2022-04-26 DIAGNOSIS — E78.2 MIXED HYPERLIPIDEMIA: ICD-10-CM

## 2022-04-26 DIAGNOSIS — R00.2 PALPITATIONS: ICD-10-CM

## 2022-04-26 DIAGNOSIS — I35.8 AORTIC VALVE SCLEROSIS: ICD-10-CM

## 2022-04-26 PROCEDURE — 93000 ELECTROCARDIOGRAM COMPLETE: CPT | Performed by: NURSE PRACTITIONER

## 2022-04-26 PROCEDURE — 99214 OFFICE O/P EST MOD 30 MIN: CPT | Performed by: NURSE PRACTITIONER

## 2022-04-26 NOTE — PROGRESS NOTES
Date of Office Visit: 2022  Encounter Provider: SALINAS Silveira  Place of Service: [facility] CARDIOLOGY  Patient Name: Jia Gillis  :1935  Primary Cardiologist: Dr. Burton    CC:  6 week follow up    Dear Dr. Ramos    HPI: Jia Gillis is a pleasant 86 y.o. female who presents 2022 for cardiac follow up. She is a new patient to me and I have reviewed her past medical records.  She is a patient of Dr. Burton.    She has a history of hypertension, hyperlipidemia, thyroid disease, history of breast cancer with radiation therapy.  Her breast cancer was first diagnosed in the  on the right side and she had a lumpectomy.  She had a recurrence in  on the right side and had a lumpectomy with radiation therapy and tamoxifen.  She then had a recurrence of breast cancer on the left side in  with a left-sided lumpectomy and no adjuvant therapy.     She is , has 3 children and is retired.  She uses no cigarettes and has social alcohol.  She has 4 cups of coffee per day.  Her sister had a myocardial infarction at the age of 40.  Her mother heart disease and hypertension.  Her son has diabetes.     In 2021, she presented to the [facility] emergency department for evaluation of lightheadedness and near syncope.  She reported that 6 months prior she was experiencing more palpitations her PCP started her on metoprolol succinate 25 mg daily.  She felt worse after starting the medications and felt like her palpitations were stronger and more prominent.  Her metoprolol was decreased to 12.5 mg twice per day and her palpitations did not improve.  She also noticed when she had a glass of wine that this would increase her palpitations.       Dr. Armaan Singleton consulted on her during the hospitalization.  Her thyroid and electrolytes were normal.  Myocardial perfusion study showed no evidence of ischemia and unifocal PVCs stayed the same during the  entire test.  Echocardiogram showed normal LVEF, no LVH or diastolic dysfunction, aortic valve sclerosis, and mild mitral annular calcification.  She was discharged on a 14-day Holter monitor which showed normal sinus rhythm, average heart rate 59, and a total PVC burden of 11.1%.  She had 3 episodes of nonsustained SVT the longest 11 beats in duration and the fastest at a rate of 158.  Her metoprolol was discontinued and she was switched to diltiazem.     She saw  Dr. Burton on 3/15/2022 and her BP was very high. She denied HA or visual changes. A recheck of  her blood pressure in the office was still high at 192/62.  She does feel her heart pounding and skipping because of frequent PVCs.  She has no chest pain or pressure.  She has no orthopnea or PND.  She has no exertional dyspnea.  She is taking her diltiazem and hydrochlorothiazide in the morning and a ramipril and rosuvastatin at night.  Her diltiazem was increased to 300 mg daily.    She returns today for follow-up with her blood pressure.  She states it is doing better.  At home she gets 130s-140-s/70.  She continues to feel intermittent palpitations.  She denies any shortness of breath, lower extremity edema.  She is not had any dizziness, lightheadedness, syncope or presyncopal episodes.  She denies fatigue.  She does notice she gets some pressure and is very gassy after eating.  This is not new.  She is taking her medications as directed.  Past Medical History:   Diagnosis Date   • Aortic valve sclerosis    • Heart murmur    • Hyperlipidemia    • Hypertension    • Malignant tumor of breast (HCC)    • Palpitations    • PVCs (premature ventricular contractions)        Past Surgical History:   Procedure Laterality Date   • BREAST BIOPSY     • BREAST SURGERY     • THYROID SURGERY         Social History     Socioeconomic History   • Marital status:    Tobacco Use   • Smoking status: Never Smoker   • Smokeless tobacco: Never Used   • Tobacco comment:  caffeine use   Substance and Sexual Activity   • Alcohol use: Not Currently     Alcohol/week: 0.0 standard drinks     Comment: wine twice monthly...social   • Drug use: Never   • Sexual activity: Not Currently     Partners: Male       Family History   Problem Relation Age of Onset   • Heart disease Mother    • Hypertension Mother    • Hypertension Father    • Cancer Father    • Heart attack Sister    • Diabetes Son        The following portion of the patient's history were reviewed and updated as appropriate: past medical history, past surgical history, past social history, past family history, allergies, current medications, and problem list.    Review of Systems   Constitutional: Negative for diaphoresis, fever and malaise/fatigue.   HENT: Negative for congestion, hearing loss, hoarse voice, nosebleeds and sore throat.    Eyes: Negative for photophobia, vision loss in left eye, vision loss in right eye and visual disturbance.   Cardiovascular: Positive for palpitations. Negative for chest pain, dyspnea on exertion, irregular heartbeat, leg swelling, near-syncope, orthopnea, paroxysmal nocturnal dyspnea and syncope.   Respiratory: Negative for cough, hemoptysis, shortness of breath, sleep disturbances due to breathing, snoring, sputum production and wheezing.    Endocrine: Negative for cold intolerance, heat intolerance, polydipsia, polyphagia and polyuria.   Hematologic/Lymphatic: Negative for bleeding problem. Does not bruise/bleed easily.   Skin: Negative for color change, dry skin, poor wound healing, rash and suspicious lesions.   Musculoskeletal: Negative for arthritis, back pain, falls, gout, joint pain, joint swelling, muscle cramps, muscle weakness and myalgias.   Gastrointestinal: Positive for flatus and heartburn. Negative for bloating, abdominal pain, constipation, diarrhea, dysphagia, melena, nausea and vomiting.   Neurological: Negative for excessive daytime sleepiness, dizziness, headaches,  "light-headedness, loss of balance, numbness, paresthesias, seizures, vertigo and weakness.   Psychiatric/Behavioral: Negative for depression, memory loss and substance abuse. The patient is not nervous/anxious.        Allergies   Allergen Reactions   • Sulfa Antibiotics GI Intolerance     NAUSEA         Current Outpatient Medications:   •  dilTIAZem CD (CARDIZEM CD) 300 MG 24 hr capsule, Take 1 capsule by mouth Every Morning., Disp: 90 capsule, Rfl: 3  •  hydroCHLOROthiazide (HYDRODIURIL) 25 MG tablet, Take 1 tablet by mouth Daily., Disp: , Rfl:   •  ramipril (ALTACE) 10 MG capsule, Take 2 capsules by mouth Every Night., Disp: 180 capsule, Rfl: 3  •  rosuvastatin (CRESTOR) 10 MG tablet, Take 1 tablet by mouth Daily., Disp: , Rfl:         Objective:     Vitals:    04/26/22 1005   BP: 140/70   Pulse: 64   Resp: 16   SpO2: 97%   Weight: 57.2 kg (126 lb)   Height: 157.5 cm (62\")     Body mass index is 23.05 kg/m².      Vitals reviewed.   Constitutional:       General: Not in acute distress.     Appearance: Normal and healthy appearance. Well-developed.   Eyes:      General:         Right eye: No discharge.         Left eye: No discharge.      Conjunctiva/sclera: Conjunctivae normal.   HENT:      Head: Normocephalic and atraumatic.      Right Ear: External ear normal.      Left Ear: External ear normal.      Nose: Nose normal.   Neck:      Thyroid: No thyromegaly.      Vascular: No JVD.      Trachea: No tracheal deviation.      Lymphadenopathy: No cervical adenopathy.   Pulmonary:      Effort: Pulmonary effort is normal. No respiratory distress.      Breath sounds: Normal breath sounds. No wheezing. No rales.   Chest:      Chest wall: Not tender to palpatation.   Cardiovascular:      Normal rate. Occasional ectopic beats. Regular rhythm.      Murmurs: There is a systolic murmur.      No gallop.   Pulses:     Intact distal pulses.   Edema:     Peripheral edema absent.   Abdominal:      General: There is no distension.     "  Palpations: Abdomen is soft.      Tenderness: There is no abdominal tenderness.   Musculoskeletal: Normal range of motion.         General: No tenderness or deformity.      Cervical back: Normal range of motion and neck supple. Skin:     General: Skin is warm and dry.      Findings: No erythema or rash.   Neurological:      Mental Status: Alert and oriented to person, place, and time.      Coordination: Coordination normal.   Psychiatric:         Attention and Perception: Attention normal.         Mood and Affect: Mood normal.         Speech: Speech normal.         Behavior: Behavior normal. Behavior is cooperative.         Thought Content: Thought content normal.         Cognition and Memory: Cognition normal.         Judgment: Judgment normal.               ECG 12 Lead    Date/Time: 4/26/2022 10:16 AM  Performed by: Earline Juan APRN  Authorized by: Earline Juan APRN   Comparison: compared with previous ECG from 3/15/2022  Similar to previous ECG  Rhythm: sinus rhythm  Ectopy: infrequent PVCs  Rate: normal  Conduction: conduction normal  ST Segments: ST segments normal  T Waves: T waves normal  QRS axis: normal    Clinical impression: non-specific ECG              Assessment:       Diagnosis Plan   1. Primary hypertension     2. Aortic valve sclerosis     3. Heart murmur     4. Palpitations     5. Mixed hyperlipidemia            Plan:       1. Hypertension, goal less than 120/80. Better with increase in diltiazem.  -140/70s.  She denies HA or visual changes.  Feels good.  2. Hyperlipidemia - continue lipid lowering therapy, currently on Crestor 10 mg.  3. PVCs - intermittent, on diltiazem   4. H/o breast cancer with radiation.     She will continue to take blood pressure, at least 2 hours after taking her morning medications, sitting quietly for 10 minutes with feet uncrossed and on the floor.  She does not usually do this prior to checking her BP at home.  RTO in 6 months with RM    As always, it has  been a pleasure to participate in your patient's care. Thank you.       Sincerely,       SALINAS Silveira      Current Outpatient Medications:   •  dilTIAZem CD (CARDIZEM CD) 300 MG 24 hr capsule, Take 1 capsule by mouth Every Morning., Disp: 90 capsule, Rfl: 3  •  hydroCHLOROthiazide (HYDRODIURIL) 25 MG tablet, Take 1 tablet by mouth Daily., Disp: , Rfl:   •  ramipril (ALTACE) 10 MG capsule, Take 2 capsules by mouth Every Night., Disp: 180 capsule, Rfl: 3  •  rosuvastatin (CRESTOR) 10 MG tablet, Take 1 tablet by mouth Daily., Disp: , Rfl:       Dictated utilizing Dragon dictation

## 2022-08-16 ENCOUNTER — TRANSCRIBE ORDERS (OUTPATIENT)
Dept: MAMMOGRAPHY | Facility: HOSPITAL | Age: 87
End: 2022-08-16

## 2022-08-16 DIAGNOSIS — Z12.31 ENCOUNTER FOR SCREENING MAMMOGRAM FOR MALIGNANT NEOPLASM OF BREAST: Primary | ICD-10-CM

## 2022-08-31 ENCOUNTER — APPOINTMENT (OUTPATIENT)
Dept: WOMENS IMAGING | Facility: HOSPITAL | Age: 87
End: 2022-08-31

## 2022-08-31 PROCEDURE — 77067 SCR MAMMO BI INCL CAD: CPT | Performed by: RADIOLOGY

## 2022-08-31 PROCEDURE — 77063 BREAST TOMOSYNTHESIS BI: CPT | Performed by: RADIOLOGY

## 2022-10-10 ENCOUNTER — CLINICAL SUPPORT (OUTPATIENT)
Dept: CARDIOLOGY | Facility: CLINIC | Age: 87
End: 2022-10-10

## 2022-10-10 ENCOUNTER — TELEPHONE (OUTPATIENT)
Dept: CARDIOLOGY | Facility: CLINIC | Age: 87
End: 2022-10-10

## 2022-10-10 VITALS
OXYGEN SATURATION: 98 % | DIASTOLIC BLOOD PRESSURE: 70 MMHG | SYSTOLIC BLOOD PRESSURE: 160 MMHG | RESPIRATION RATE: 16 BRPM | HEART RATE: 83 BPM

## 2022-10-10 DIAGNOSIS — R00.2 PALPITATIONS: Primary | ICD-10-CM

## 2022-10-10 PROCEDURE — 93000 ELECTROCARDIOGRAM COMPLETE: CPT | Performed by: NURSE PRACTITIONER

## 2022-10-10 NOTE — TELEPHONE ENCOUNTER
Spoke to patient. She is on her way to [place] now to get EKG complete. I told her that you leave at 12 and she said she will be there soon.     Suzette Jaquez RN  Triage [facility] [place]

## 2022-10-10 NOTE — PROGRESS NOTES
Procedure     ECG 12 Lead    Date/Time: 10/10/2022 11:41 AM  Performed by: Earline Juan APRN  Authorized by: Earline Juan APRN   Comparison: compared with previous ECG from 4/26/2022  Similar to previous ECG  Rhythm: sinus rhythm  Rate: normal  Conduction: conduction normal  ST Depression: III, II, V4, V5 and V6 (borderline)  T Waves: T waves normal  QRS axis: normal    Clinical impression: non-specific ECG          Patient phoned our office as she stated that she has been having increased heart rate.  Please have her come in today for EKG that showed a heart rate of 83, normal sinus rhythm.  She states that she has been taking Stahist AD recently for sinus infection.  Her last dose was Friday.  She still noticed an increased heart rate on Saturday.  Her blood pressure was a bit elevated today.  On recheck I got 150/78.  She is taking her medications as directed.  We discussed the interaction of decongestants and how they affect her blood pressure and heart rate.  I have asked her not to take any more decongestants.  She voiced understanding.

## 2022-10-10 NOTE — TELEPHONE ENCOUNTER
Patient called because on Saturday morning she woke up feeling lightheaded and she noticed her HR was 99. She said despite taking her medications her HR stayed in the 90s all day. On Sunday, she had no problems and HR was baseline for her in the 60s. Now this AM her HR is back up in the 90s and she feels a little lightheaded again. She took her BP and HR while we are on the phone. /90 HR 97 she just took her AM meds about an hour ago. BP last night was 141/69. She is scheduled to see Dr. Burton on 11/18. Below are a list of her medications:    Ramipril 20mg nightly  Diltiazem 300mg daily  hydrochlorothiazide 25mg daily    Suzette Jaquez RN  Triage [facility] [place]

## 2022-11-18 ENCOUNTER — OFFICE VISIT (OUTPATIENT)
Dept: CARDIOLOGY | Facility: CLINIC | Age: 87
End: 2022-11-18

## 2022-11-18 VITALS
BODY MASS INDEX: 22.63 KG/M2 | SYSTOLIC BLOOD PRESSURE: 124 MMHG | HEIGHT: 62 IN | DIASTOLIC BLOOD PRESSURE: 74 MMHG | WEIGHT: 123 LBS | HEART RATE: 65 BPM

## 2022-11-18 DIAGNOSIS — E78.2 MIXED HYPERLIPIDEMIA: ICD-10-CM

## 2022-11-18 DIAGNOSIS — I10 PRIMARY HYPERTENSION: Primary | ICD-10-CM

## 2022-11-18 DIAGNOSIS — I49.3 PVCS (PREMATURE VENTRICULAR CONTRACTIONS): ICD-10-CM

## 2022-11-18 PROCEDURE — 99214 OFFICE O/P EST MOD 30 MIN: CPT | Performed by: INTERNAL MEDICINE

## 2022-11-18 PROCEDURE — 93000 ELECTROCARDIOGRAM COMPLETE: CPT | Performed by: INTERNAL MEDICINE

## 2022-11-18 RX ORDER — DIPHENOXYLATE HYDROCHLORIDE AND ATROPINE SULFATE 2.5; .025 MG/1; MG/1
TABLET ORAL DAILY
COMMUNITY

## 2022-11-18 NOTE — PROGRESS NOTES
Date of Office Visit: 2022  Encounter Provider: Belkys Burton MD  Place of Service: [facility] CARDIOLOGY  Patient Name: Jia Gillis  :1935      Patient ID:  Jia Gillis is a 87 y.o. female is here for  followup for PVCs and hypertension.        History of Present Illness    She has a history of hypertension, hyperlipidemia, thyroid disease, history of breast cancer with radiation therapy.  Her breast cancer was first diagnosed in the  on the right side and she had a lumpectomy.  She had a recurrence in  on the right side and had a lumpectomy with radiation therapy and tamoxifen.  She then had a recurrence of breast cancer on the left side in  with a left-sided lumpectomy and no adjuvant therapy.     She is , has 3 children and is retired.  She uses no cigarettes and has social alcohol.  She has 4 cups of coffee per day.  Her sister had a myocardial infarction at the age of 40.  Her mother heart disease and hypertension.  Her son has diabetes.     In 2021, she presented to the [facility] emergency department for evaluation of lightheadedness and near syncope.  She reported that 6 months prior she was experiencing more palpitations her PCP started her on metoprolol succinate 25 mg daily.  She felt worse after starting the medications and felt like her palpitations were stronger and more prominent.  Her metoprolol was decreased to 12.5 mg twice per day and her palpitations did not improve.  She also noticed when she had a glass of wine that this would increase her palpitations.       Dr. Armaan Singleton consulted on her during the hospitalization.  Her thyroid and electrolytes were normal.  Myocardial perfusion study showed no evidence of ischemia and unifocal PVCs stayed the same during the entire test.  Echocardiogram showed normal LVEF, no LVH or diastolic dysfunction, aortic valve sclerosis, and mild mitral annular calcification.  She  was discharged on a 14-day Holter monitor which showed normal sinus rhythm, average heart rate 59, and a total PVC burden of 11.1%.  She had 3 episodes of nonsustained SVT the longest 11 beats in duration and the fastest at a rate of 158.  Her metoprolol was discontinued and she was switched to diltiazem.     She has no chest pain.  She has higher blood pressure at home running 140-150 but it was better over the summer when she was exercising doing exercises in the pool.  Currently she is not exercising her blood pressure is gone up.  Of the summer is running more 130-140.  She does not feel her heart racing or skipping.  She has had no dizziness or falls.  She does feel her self getting more weak because she is not exercising.  She has a lot of back pain and the back pain causes her blood pressure to go up and then also causes her not to want to exercise.       Past Medical History:   Diagnosis Date   • Aortic valve sclerosis    • Heart murmur    • Hyperlipidemia    • Hypertension    • Malignant tumor of breast (HCC)    • Palpitations    • PVCs (premature ventricular contractions)          Past Surgical History:   Procedure Laterality Date   • BREAST BIOPSY     • BREAST SURGERY     • THYROID SURGERY         Current Outpatient Medications on File Prior to Visit   Medication Sig Dispense Refill   • dilTIAZem CD (CARDIZEM CD) 300 MG 24 hr capsule Take 1 capsule by mouth Every Morning. 90 capsule 3   • hydroCHLOROthiazide (HYDRODIURIL) 25 MG tablet Take 1 tablet by mouth Daily.     • multivitamin (MULTIVITAMIN PO) Take  by mouth Daily.     • ramipril (ALTACE) 10 MG capsule Take 2 capsules by mouth Every Night. 180 capsule 3   • rosuvastatin (CRESTOR) 10 MG tablet Take 1 tablet by mouth Daily.       No current facility-administered medications on file prior to visit.       Social History     Socioeconomic History   • Marital status:    Tobacco Use   • Smoking status: Never   • Smokeless tobacco: Never   • Tobacco  "comments:     caffeine use   Substance and Sexual Activity   • Alcohol use: Not Currently     Comment: wine twice monthly...social   • Drug use: Never   • Sexual activity: Not Currently     Partners: Male     Birth control/protection: None           ROS    Procedures    ECG 12 Lead    Date/Time: 11/18/2022 2:34 PM  Performed by: Belkys Burton MD  Authorized by: Belkys Burton MD   Comparison: compared with previous ECG   Comparison to previous ECG: Now pvcs  Rhythm: sinus rhythm  Ectopy: unifocal PVCs    Clinical impression: non-specific ECG                Objective:      Vitals:    11/18/22 1412   BP: 124/74   Pulse: 65   Weight: 55.8 kg (123 lb)   Height: 157.5 cm (62\")     Body mass index is 22.5 kg/m².    Vitals reviewed.   Constitutional:       General: Not in acute distress.     Appearance: Well-developed. Not diaphoretic.   Eyes:      General: No scleral icterus.     Conjunctiva/sclera: Conjunctivae normal.   HENT:      Head: Normocephalic and atraumatic.   Neck:      Thyroid: No thyromegaly.      Vascular: No carotid bruit or JVD.      Lymphadenopathy: No cervical adenopathy.   Pulmonary:      Effort: Pulmonary effort is normal. No respiratory distress.      Breath sounds: Normal breath sounds. No wheezing. No rhonchi. No rales.   Chest:      Chest wall: Not tender to palpatation.   Cardiovascular:      Normal rate. Regularly irregular rhythm.      No gallop.   Pulses:     Intact distal pulses.   Edema:     Peripheral edema absent.   Abdominal:      General: Bowel sounds are normal. There is no distension or abdominal bruit.      Palpations: Abdomen is soft. There is no abdominal mass.      Tenderness: There is no abdominal tenderness.   Musculoskeletal:         General: No deformity.      Extremities: No clubbing present.     Cervical back: Neck supple. Skin:     General: Skin is warm and dry. There is no cyanosis.      Coloration: Skin is not pale.      Findings: No rash.   Neurological:     "  Mental Status: Alert and oriented to person, place, and time.      Cranial Nerves: No cranial nerve deficit.   Psychiatric:         Judgment: Judgment normal.         Lab Review:       Assessment:      Diagnosis Plan   1. Primary hypertension        2. Mixed hyperlipidemia        3. PVCs (premature ventricular contractions)          1. Hypertension, goal less than 120/80.    Her blood pressure checks at home are mid 140s.  Maintain current medications, increase exercise because her blood pressure was better when she was exercising  2. Hyperlipidemia, on rosuvastatin.  3. PVCs, on diltiazem for this  4. H/o breast cancer with radiation.   5. Back pain which increases her blood pressure and causes her to exercise less.     Plan:       See [place] in 6 months, no other testing this time and no changes to her medications, will get her laboratory values from [place].

## 2022-11-22 ENCOUNTER — HOSPITAL ENCOUNTER (OUTPATIENT)
Dept: PHYSICAL THERAPY | Facility: HOSPITAL | Age: 87
Setting detail: THERAPIES SERIES
Discharge: HOME | End: 2022-11-22

## 2022-11-22 DIAGNOSIS — M54.6 THORACIC BACK PAIN, UNSPECIFIED BACK PAIN LATERALITY, UNSPECIFIED CHRONICITY: Primary | ICD-10-CM

## 2022-11-22 PROCEDURE — 97161 PT EVAL LOW COMPLEX 20 MIN: CPT | Performed by: PHYSICAL THERAPIST

## 2022-11-22 NOTE — THERAPY EVALUATION
Outpatient Physical Therapy Ortho Initial Evaluation  RAVINDRA Bauer     Patient Name: Jia Gillis  : 1935  MRN: [id number]  Today's Date: 2022      Visit Date: 2022    Patient Active Problem List   Diagnosis   • Near syncope   • HLD (hyperlipidemia)   • HTN (hypertension)   • Stage 3b chronic kidney disease (HCC)   • Palpitations   • Hyperglycemia   • PVCs (premature ventricular contractions)   • Heart murmur   • Aortic valve sclerosis        Past Medical History:   Diagnosis Date   • Aortic valve sclerosis    • Heart murmur    • Hyperlipidemia    • Hypertension    • Malignant tumor of breast (HCC)    • Palpitations    • PVCs (premature ventricular contractions)         Past Surgical History:   Procedure Laterality Date   • BREAST BIOPSY     • BREAST SURGERY     • THYROID SURGERY         Visit Dx:     ICD-10-CM ICD-9-CM   1. Thoracic back pain, unspecified back pain laterality, unspecified chronicity  M54.6 724.1          Patient History     Row Name 22 1200             History    Chief Complaint Pain  -SP      Type of Pain Back pain  thoracic  -SP      Date Current Problem(s) Began --  order date 11-15-22  -      Brief Description of Current Complaint Patient reports chronic back pain in upper back for approximately 7 years.  She has not had any x-rays.  She has tried PT in the past but it did not seem to help.  Patient reports that she did water aerobics and states that her pain decreased when doing that.  Initially felt pain in area of bra.  Patient reports that she has cramps or spasm type pain.  Patient is reluctant to take meds.  Patient take OTC meds.  Patient's daughter reports that patient often sits and does not move  -SP      Patient/Caregiver Goals Know what to do to help the symptoms  -SP      Patient's Rating of General Health Good  -SP      Occupation/sports/leisure activities knitting  -SP      How has patient tried to help current problem? heat, ice OTC meds, massager  that she sits on  -SP         Pain     Pain Location Back  -SP      Pain at Present 4  -SP      Pain at Worst 8  -SP      Pain Frequency Intermittent  -SP      Pain Description Aching;Sharp;Shooting  -SP      What Performance Factors Make the Current Problem(s) WORSE? standing at counter to cook, standing, sitting without good posture  -SP      What Performance Factors Make the Current Problem(s) BETTER? heat, ice, OTC meds  -SP      Tolerance Time- Standing limited  -SP      Tolerance Time- Sitting limtied without support  -SP      Tolerance Time- Walking limited  -SP      Is your sleep disturbed? No  -SP      What position do you sleep in? Right sidelying;Left sidelying  -SP      Difficulties with ADL's? static positions, prolonged stand especially while performing a task, cooking etc; prolonged sit especially with looking down  -SP         Fall Risk Assessment    Any falls in the past year: No  -SP         Daily Activities    Primary Language English  -SP      Are you able to read Yes  -SP      Are you able to write Yes  -SP      How does patient learn best? Listening;Reading;Demonstration;Pictures/Video  -SP      Teaching needs identified Home Exercise Program;Management of Condition  -SP      Does patient have problems with the following? None  -SP      Barriers to learning None  -SP      Pt Participated in POC and Goals Yes  -SP         Safety    Are you being hurt, hit, or frightened by anyone at home or in your life? No  -SP      Are you being neglected by a caregiver No  -SP            User Key  (r) = Recorded By, (t) = Taken By, (c) = Cosigned By    Initials Name Provider Type    Do Lin, PT Physical Therapist                 PT Ortho     Row Name 11/22/22 1500       Posture/Observations    Forward Head Moderate  -SP    Cervical Lordosis Decreased  -SP    Rounded Shoulders Bilateral:;Moderate  -SP       Sensory Screen for Light Touch- Upper Quarter Clearing    C4 (posterior shoulder)  Bilateral:;Intact  -SP    C5 (lateral upper arm) Bilateral:;Intact  -SP    C6 (tip of thumb) Bilateral:;Intact  -SP    C7 (tip of 3rd finger) Bilateral:;Intact  -SP    C8 (tip of 5th finger) Bilateral:;Intact  -SP    T1 (medial lower arm) Bilateral:;Intact  -SP       General ROM    GENERAL ROM COMMENTS cervical spine AROM WFL:  bilateral UE AROM WFL  -SP       Head/Neck/Trunk    Trunk Extension AROM minimal thoracic spine extension; lumbar spine extension limited by 75% from full range  -SP    Trunk Flexion AROM WFL  -SP    Trunk Lt Lateral Flexion AROM decreased by 50% from full range  -SP    Trunk Rt Lateral Flexion AROM decreased by 50% from full range  -SP    Trunk Lt Rotation AROM decreased by 50% from full range  -SP    Trunk Rt Rotation AROM decreased by 50% from full range  -SP       MMT Right Upper Ext    Rt Shoulder Flexion MMT, Gross Movement (4/5) good  -SP    Rt Shoulder Extension MMT, Gross Movement (4+/5) good plus  -SP    Rt Shoulder ABduction MMT, Gross Movement (4/5) good  -SP    Rt Shoulder Internal Rotation MMT, Gross Movement (4/5) good  -SP    Rt Shoulder External Rotation MMT, Gross Movement (4-/5) good minus  -SP    Rt Scapular ADduction MMT, Gross Movement (3-/5) fair minus  -SP    Rt Scapular ADduction & Depression MMT, Gross Movement (3-/5) fair minus  -SP       MMT Left Upper Ext    Lt Shoulder Flexion MMT, Gross Movement (4/5) good  -SP    Lt Shoulder Extension MMT, Gross Movement (4+/5) good plus  -SP    Lt Shoulder ABduction MMT, Gross Movement (4/5) good  -SP    Lt Shoulder Internal Rotation MMT, Gross Movement (4/5) good  -SP    Lt Shoulder External Rotation MMT, Gross Movement (3+/5) fair plus  -SP    Lt Scapular ADduction MMT, Gross Movement (3-/5) fair minus  -SP    Lt Scapular ADduction & Depression MMT, Gross Movement (3-/5) fair minus  -SP       Sensation    Sensation WNL? WFL  -SP       Upper Extremity Flexibility    Suboccipitals Bilateral:;Moderately limited  -SP    Scalenes  Bilateral:;Moderately limited  -SP    Upper Trapezius Bilateral:;Mildly limited  -SP    Pect Minor Bilateral:;Moderately limited  -SP    Pect Major Bilateral:;Moderately limited  -SP       Transfers    Bed-Chair [place] (Transfers) independent  -SP    Chair-Bed [place] (Transfers) independent  -SP    Sit-Stand [place] (Transfers) independent  -SP    Stand-Sit [place] (Transfers) independent  -SP    Comment, (Transfers) Patient uses UEs to assist with transfers sit to stand  -SP       Gait/Stairs (Locomotion)    [place] Level (Gait) independent  -SP    Pattern (Gait) swing-through  -SP          User Key  (r) = Recorded By, (t) = Taken By, (c) = Cosigned By    Initials Name Provider Type    Do Lin, PT Physical Therapist                            Therapy Education  Given: HEP, Posture/body mechanics  Program: New  How Provided: Verbal, Written  Provided to: Patient  Level of Understanding: Verbalized, Demonstrated      PT OP Goals     Row Name 11/22/22 1500          PT Short Term Goals    STG Date to Achieve 12/07/22  -SP     STG 1 Patient exhibits decrease forward head and shoulder posture >25% time during treatment session  -SP     STG 2 Patient reports independence with initial HEP daily  -SP     STG 3 Patient reports decreased pain in thoracic spine area with ADLs to <2/10 at worst  -SP        Long Term Goals    LTG Date to Achieve 12/22/22  -SP     LTG 1 Patient demonstrates increased postural muscle strength by one muscle grade  -SP     LTG 2 Patient reports decreased mid back/thoracic area pain to 0-1/10  -SP     LTG 3 Patient independent with HEP  -SP        Time Calculation    PT Goal Re-Cert Due Date 12/22/22  -SP           User Key  (r) = Recorded By, (t) = Taken By, (c) = Cosigned By    Initials Name Provider Type    Do Lin, PT Physical Therapist                 PT Assessment/Plan     Row Name 11/22/22 9162          PT Assessment     Functional Limitations Limitation in home management;Limitations in community activities;Performance in self-care ADL;Performance in leisure activities;Limitations in functional capacity and performance  -SP     Assessment Comments Patient with chronic history of insidious onset thoracic spine area pain.  Patient with poor postural alignment with forward head posture, mild CT junction kyphosis and significant forward shoulder posture.  Patient presents with pain, poor posture, decreased postural muscle strength, decreased flexibility and difficutly tolerating home and community ADLs due to back pain  -SP     Please refer to paper survey for additional self-reported information Yes  -SP     Rehab Potential Good  -SP     Patient/caregiver participated in establishment of treatment plan and goals Yes  -SP     Patient would benefit from skilled therapy intervention Yes  -SP        PT Plan    PT Frequency 1x/week  -SP     Predicted Duration of Therapy Intervention (PT) 4 weeks  -SP     Planned CPT's? PT EVAL LOW COMPLEXITY: 11185;PT THER PROC EA 15 MIN: 13776;PT MANUAL THERAPY EA 15 MIN: 56186;PT HOT OR COLD PACK TREAT MCARE;PT ELECTRICAL STIM UNATTEND: ;PT ULTRASOUND EA 15 MIN: 70622  -SP           User Key  (r) = Recorded By, (t) = Taken By, (c) = Cosigned By    Initials Name Provider Type    Do Lin, PT Physical Therapist                 Modalities     Row Name 11/22/22 1500             Moist Heat    MH Applied Yes  -SP      Location mid back/thoracic spine area; patient supine 90/90  -SP      PT Moist Heat Minutes 10  -SP      MH S/P Rx Yes  -SP            User Key  (r) = Recorded By, (t) = Taken By, (c) = Cosigned By    Initials Name Provider Type    Do Lin, PT Physical Therapist               OP Exercises     Row Name 11/22/22 1500             Exercise 1    Exercise Name 1 tband rows  -SP      Cueing 1 Verbal;Demo  -SP      Reps 1 10  -SP      Time 1 red  -SP          Exercise 2    Exercise Name 2 bilateral shoulder ER with scapula retraction  -SP      Cueing 2 Verbal;Demo  -SP      Reps 2 10  -SP      Time 2 yellow  -SP         Exercise 3    Exercise Name 3 supine with towel roll under cervical spine  -SP      Cueing 3 Verbal  -SP      Time 3 2 min  -SP         Exercise 4    Exercise Name 4 sidelying trunk rotation stretch  -SP      Cueing 4 Verbal;Tactile  -SP      Reps 4 3  -SP      Time 4 20 sec  -SP         Exercise 5    Exercise Name 5 doorway stretch  -SP      Cueing 5 Verbal;Demo  -SP      Reps 5 3  -SP      Time 5 15 sec  -SP         Exercise 6    Exercise Name 6 thoracic stretch seated with ball  -SP      Cueing 6 Verbal;Demo  -SP      Reps 6 5  -SP      Time 6 5 sec  -SP            User Key  (r) = Recorded By, (t) = Taken By, (c) = Cosigned By    Initials Name Provider Type    Do Lin, PT Physical Therapist                              Outcome Measure Options: Other Outcome Measure  Other Outcome Measure Tool Used  Other Outcome Measure Tool Comments: back index score 42      Time Calculation:     Start Time: 1235  Stop Time: 1340  Time Calculation (min): 65 min  Untimed Charges  PT Eval/Re-eval Minutes: 60  PT Moist Heat Minutes: 10  Total Minutes  Untimed Charges Total Minutes: 60   Total Minutes: 60     Therapy Charges for Today     Code Description Service Date Service Provider Modifiers Qty    53635112432 HC PT EVAL LOW COMPLEXITY 4 11/22/2022 Do Byrnes, PT GP 1          PT G-Codes  Outcome Measure Options: Other Outcome Measure         Do Byrnes, KAREN  11/22/2022       declines

## 2022-12-06 ENCOUNTER — HOSPITAL ENCOUNTER (OUTPATIENT)
Dept: PHYSICAL THERAPY | Facility: HOSPITAL | Age: 87
Setting detail: THERAPIES SERIES
Discharge: HOME | End: 2022-12-06

## 2022-12-06 DIAGNOSIS — M54.6 THORACIC BACK PAIN, UNSPECIFIED BACK PAIN LATERALITY, UNSPECIFIED CHRONICITY: Primary | ICD-10-CM

## 2022-12-06 PROCEDURE — 97035 APP MDLTY 1+ULTRASOUND EA 15: CPT | Performed by: PHYSICAL THERAPIST

## 2022-12-06 PROCEDURE — 97110 THERAPEUTIC EXERCISES: CPT | Performed by: PHYSICAL THERAPIST

## 2022-12-06 NOTE — THERAPY TREATMENT NOTE
Outpatient Physical Therapy Ortho Treatment Note  RAVINDRA Bauer     Patient Name: Jia Gillis  : 1935  MRN: [id number]  Today's Date: 2022      Visit Date: 2022    Visit Dx:    ICD-10-CM ICD-9-CM   1. Thoracic back pain, unspecified back pain laterality, unspecified chronicity  M54.6 724.1       Patient Active Problem List   Diagnosis   • Near syncope   • HLD (hyperlipidemia)   • HTN (hypertension)   • Stage 3b chronic kidney disease (HCC)   • Palpitations   • Hyperglycemia   • PVCs (premature ventricular contractions)   • Heart murmur   • Aortic valve sclerosis        Past Medical History:   Diagnosis Date   • Aortic valve sclerosis    • Heart murmur    • Hyperlipidemia    • Hypertension    • Malignant tumor of breast (HCC)    • Palpitations    • PVCs (premature ventricular contractions)         Past Surgical History:   Procedure Laterality Date   • BREAST BIOPSY     • BREAST SURGERY     • THYROID SURGERY          PT Ortho     Row Name 22 1300       Subjective Comments    Subjective Comments Patient reports that she felt better for about a week after last visit and she was doing her exercises without difficulty, other than some shoulder discomfort with doorway stretch.   However, her pain in left thoracic area returned when she wore a bra for [employer]. Pain has continued to have pain since this.  -SP          User Key  (r) = Recorded By, (t) = Taken By, (c) = Cosigned By    Initials Name Provider Type    Do Lin, PT Physical Therapist                             PT Assessment/Plan     Row Name 22 1423          PT Assessment    Assessment Comments Patient with some improvement but not maintained.  She continues to report pain increased with wearing a bra.  Trial of ultrasound and kinesiotape.  -SP        PT Plan    PT Plan Comments Assess response to kinsiotape  -SP           User Key  (r) = Recorded By, (t) = Taken By, (c) = Cosigned By    Initials Name Provider  "Type    SP Do Byrnes, PT Physical Therapist                 Modalities     Row Name 12/06/22 1300             Moist Heat    MH Applied Yes  -SP      Location mid back/thoracic spine area; patient supine 90/90  -SP      PT Moist Heat Minutes 10  -SP      MH S/P Rx Yes  -SP         Ultrasound 49805    Location left mid-lower thoracic paraspinals; patient in right sidelying  -SP      Combo RX Minutes 48876 10  -SP      Duty Cycle 100  -SP      Frequency 1.0 MHz  -SP      Intensity - Wts/cm 1.5  -SP         Other Treatment Provided    Taping / Bracing kinesiotape: 2 \"I\" strips along thoracic paraspinals; 2 \"I\" strips in x pattern over tender area space correct  -SP            User Key  (r) = Recorded By, (t) = Taken By, (c) = Cosigned By    Initials Name Provider Type    Do Lin, PT Physical Therapist               OP Exercises     Row Name 12/06/22 1424 12/06/22 1300          Subjective Comments    Subjective Comments -- Patient reports that she felt better for about a week after last visit and she was doing her exercises without difficulty, other than some shoulder discomfort with doorway stretch.   However, her pain in left thoracic area returned when she wore a bra for [employer]. Pain has continued to have pain since this.  -SP        Total Minutes    88038 - PT Therapeutic Exercise Minutes 15  -SP --        Exercise 1    Exercise Name 1 -- tband rows  -SP     Cueing 1 -- Verbal;Demo  -SP     Reps 1 -- 10  -SP     Time 1 -- red  -SP        Exercise 2    Exercise Name 2 -- bilateral shoulder ER with scapula retraction  -SP     Cueing 2 -- Verbal;Demo  -SP     Reps 2 -- 10  -SP     Time 2 -- yellow  -SP        Exercise 3    Exercise Name 3 -- supine on foam roll; pec stretch  -SP     Cueing 3 -- Verbal  -SP     Time 3 -- 4 min  -SP        Exercise 4    Exercise Name 4 -- sidelying trunk rotation stretch  -SP     Cueing 4 -- Verbal;Tactile  -SP     Reps 4 -- 3  -SP     Time 4 -- 20 sec  " -SP        Exercise 6    Exercise Name 6 -- thoracic stretch seated with ball  -SP     Cueing 6 -- Verbal;Demo  -SP     Reps 6 -- 5  -SP     Time 6 -- 5 sec  -SP           User Key  (r) = Recorded By, (t) = Taken By, (c) = Cosigned By    Initials Name Provider Type    Do Lin, PT Physical Therapist                                 Therapy Education  Education Details: Patient advised to remove tape due to any irritation or discomfort, other wise remove within 4 days.  Given: HEP  Program: Modified  How Provided: Verbal  Provided to: Patient  Level of Understanding: Verbalized, Demonstrated              Time Calculation:   Start Time: 1300  Stop Time: 1400  Time Calculation (min): 60 min  Timed Charges  98682 - PT Ultrasound Minutes: 10  69133 - PT Therapeutic Exercise Minutes: 15  Untimed Charges  PT Moist Heat Minutes: 10  Total Minutes  Timed Charges Total Minutes: 25  Untimed Charges Total Minutes: 10   Total Minutes: 25  Therapy Charges for Today     Code Description Service Date Service Provider Modifiers Qty    47086739741 HC PT THER PROC EA 15 MIN 12/6/2022 Do Byrnes, PT GP 1    29976340292 HC PT ULTRASOUND EA 15 MIN 12/6/2022 Do Byrnes, PT GP 1                    Do Byrnes PT  12/6/2022

## 2022-12-15 ENCOUNTER — HOSPITAL ENCOUNTER (OUTPATIENT)
Dept: PHYSICAL THERAPY | Facility: HOSPITAL | Age: 87
Setting detail: THERAPIES SERIES
Discharge: HOME | End: 2022-12-15

## 2022-12-15 ENCOUNTER — TELEPHONE (OUTPATIENT)
Dept: CARDIOLOGY | Facility: CLINIC | Age: 87
End: 2022-12-15

## 2022-12-15 DIAGNOSIS — M54.6 THORACIC BACK PAIN, UNSPECIFIED BACK PAIN LATERALITY, UNSPECIFIED CHRONICITY: Primary | ICD-10-CM

## 2022-12-15 PROCEDURE — 97035 APP MDLTY 1+ULTRASOUND EA 15: CPT | Performed by: PHYSICAL THERAPIST

## 2022-12-15 PROCEDURE — 97110 THERAPEUTIC EXERCISES: CPT | Performed by: PHYSICAL THERAPIST

## 2022-12-15 NOTE — TELEPHONE ENCOUNTER
Pt went to see her PCP today and they put her on Metoprolol-er Succinate 25mg 1 po daily . The pt is asking if this safe for to take with her other meds.. Thanks KL

## 2022-12-15 NOTE — THERAPY TREATMENT NOTE
Outpatient Physical Therapy Ortho Treatment Note  RAVINDRA Bauer     Patient Name: Jia Gillis  : 1935  MRN: [id number]  Today's Date: 12/15/2022      Visit Date: 12/15/2022    Visit Dx:    ICD-10-CM ICD-9-CM   1. Thoracic back pain, unspecified back pain laterality, unspecified chronicity  M54.6 724.1       Patient Active Problem List   Diagnosis   • Near syncope   • HLD (hyperlipidemia)   • HTN (hypertension)   • Stage 3b chronic kidney disease (HCC)   • Palpitations   • Hyperglycemia   • PVCs (premature ventricular contractions)   • Heart murmur   • Aortic valve sclerosis        Past Medical History:   Diagnosis Date   • Aortic valve sclerosis    • Heart murmur    • Hyperlipidemia    • Hypertension    • Malignant tumor of breast (HCC)    • Palpitations    • PVCs (premature ventricular contractions)         Past Surgical History:   Procedure Laterality Date   • BREAST BIOPSY     • BREAST SURGERY     • THYROID SURGERY          PT Ortho     Row Name 12/15/22 1300       Subjective Comments    Subjective Comments Patient reports that she felt much better for 2 days following last visit.   Patient reports that she is doing exercises daily.  -SP          User Key  (r) = Recorded By, (t) = Taken By, (c) = Cosigned By    Initials Name Provider Type    Do Lin, PT Physical Therapist                             PT Assessment/Plan     Row Name 12/15/22 1428 12/15/22 1423       PT Plan    PT Plan Comments Continue to progress as tolerable  -SP Continue to progress as tolerable  -SP    Row Name 12/15/22 1416          PT Assessment    Assessment Comments Patient with short term relief following treatment.  She does reports that symptoms do improve with stretches and she is doing them daily.  -SP           User Key  (r) = Recorded By, (t) = Taken By, (c) = Cosigned By    Initials Name Provider Type    Do Lin, PT Physical Therapist                 Modalities     Row Name  "12/15/22 1300             Moist Heat    MH Applied Yes  -SP      Location mid back/thoracic spine area; patient supine 90/90  -SP      PT Moist Heat Minutes 10  -SP      MH S/P Rx Yes  -SP         Ultrasound 11840    Location left mid-lower thoracic paraspinals; patient in right sidelying  -SP      Combo RX Minutes 53944 10  -SP      Duty Cycle 100  -SP      Frequency 1.0 MHz  -SP      Intensity - Wts/cm 1.5  -SP         Other Treatment Provided    Taping / Bracing kinesiotape: 2 \"I\" strips along thoracic paraspinals; 2 \"I\" strips in x pattern over tender area space correct  -SP            User Key  (r) = Recorded By, (t) = Taken By, (c) = Cosigned By    Initials Name Provider Type    Do Lin, PT Physical Therapist               OP Exercises     Row Name 12/15/22 1424 12/15/22 1300          Subjective Comments    Subjective Comments -- Patient reports that she felt much better for 2 days following last visit.   Patient reports that she is doing exercises daily.  -SP        Total Minutes    10611 - PT Therapeutic Exercise Minutes 15  -SP --        Exercise 1    Exercise Name 1 -- tband rows  -SP     Cueing 1 -- Verbal;Demo  -SP     Reps 1 -- 10  -SP     Time 1 -- red  -SP        Exercise 2    Exercise Name 2 -- bilateral shoulder ER with scapula retraction  -SP     Cueing 2 -- Verbal;Demo  -SP     Reps 2 -- 10  -SP     Time 2 -- yellow  -SP        Exercise 3    Exercise Name 3 -- supine on foam roll; pec stretch  -SP     Cueing 3 -- Verbal  -SP     Time 3 -- 4 min  -SP        Exercise 4    Exercise Name 4 -- sidelying trunk rotation stretch  -SP     Cueing 4 -- Verbal;Tactile  -SP     Reps 4 -- 3  -SP     Time 4 -- 20 sec  -SP        Exercise 6    Exercise Name 6 -- thoracic stretch seated with ball  -SP     Cueing 6 -- Verbal;Demo  -SP     Reps 6 -- 5  -SP     Time 6 -- 5 sec  -SP           User Key  (r) = Recorded By, (t) = Taken By, (c) = Cosigned By    Initials Name Provider Type    SP " Do Byrnes, PT Physical Therapist                                 Therapy Education  Given: HEP  Program: Reinforced  How Provided: Verbal  Provided to: Patient  Level of Understanding: Verbalized, Demonstrated              Time Calculation:   Start Time: 1307  Stop Time: 1400  Time Calculation (min): 53 min  Timed Charges  95129 - PT Ultrasound Minutes: 10  96775 - PT Therapeutic Exercise Minutes: 15  Untimed Charges  PT Moist Heat Minutes: 10  Total Minutes  Timed Charges Total Minutes: 25  Untimed Charges Total Minutes: 10   Total Minutes: 25  Therapy Charges for Today     Code Description Service Date Service Provider Modifiers Qty    00034640229 HC PT THER PROC EA 15 MIN 12/15/2022 Do Byrnes, PT GP 1    61085190382 HC PT ULTRASOUND EA 15 MIN 12/15/2022 Do Byrnes, PT GP 1                    Do Byrnes, PT  12/15/2022

## 2022-12-16 NOTE — TELEPHONE ENCOUNTER
I spoke with Jia Gillis and updated pt on recommendations from provider.  Pt verbalized understanding and has no further questions at this time.  She reports that her PCP filled Toprol XL script so she doesn't need that filled.    Thank you,    Anna Guy RN  Triage [facility]  12/16/22 10:53 EST

## 2022-12-16 NOTE — TELEPHONE ENCOUNTER
Pt called again this morning with concern over her BP and PCP prescribing an antihypertensive for her.    Pt reports she saw her PCP yesterday and he started her on Toprol XL 25 mg Q PM.  She states she hasn't started taking this yet.  It appears that you prescribe all of her cardiac medications.    Pt states this morning her BP was 189/79 HR 70.  Her only associated symptom is mild palpitations.  She still takes all of the other medications on her current med list with us as prescribed.  She reports her BP at home has been on average this week 154/64 to to 198/72.  She was concerned that her PCP was prescribing BP meds while you have historically prescribed these for her, and she wanted your approval before she started.    I clarified with pt and she prefers any new scripts to be sent to the [facility] in [place] (added as her preferred pharmacy).    Do you have any recommendations for this patient?    Thank you,    VANDA Redd Triage  12/16/22  10:23 EST

## 2022-12-16 NOTE — TELEPHONE ENCOUNTER
She really is maxed out on her other medications. Her diltiazem could be increased but patients tend to have leg swelling with that high of a dose and that dose isn't likely to help her BP. It is would be a good option to try adding the Toprol 25 mg at night to see if her BP improves. Did Dr. Ramos send the prescription in. If not, we can do it.     She's been on Toprol before for palpitations which didn't help her palpitations. Hopefully, this won't be an issue since she' also on diltiazem for this.     Thanks!  Alexandria Joseph, APRN

## 2023-01-16 ENCOUNTER — TRANSCRIBE ORDERS (OUTPATIENT)
Dept: ADMINISTRATIVE | Facility: HOSPITAL | Age: 88
End: 2023-01-16
Payer: MEDICARE

## 2023-01-16 DIAGNOSIS — R07.9 CHEST PAIN, UNSPECIFIED TYPE: Primary | ICD-10-CM

## 2023-01-20 ENCOUNTER — HOSPITAL ENCOUNTER (OUTPATIENT)
Dept: NUCLEAR MEDICINE | Facility: HOSPITAL | Age: 88
End: 2023-01-20
Payer: MEDICARE

## 2023-01-20 ENCOUNTER — HOSPITAL ENCOUNTER (OUTPATIENT)
Dept: CARDIOLOGY | Facility: HOSPITAL | Age: 88
Discharge: HOME | End: 2023-01-20
Payer: MEDICARE

## 2023-01-20 ENCOUNTER — HOSPITAL ENCOUNTER (OUTPATIENT)
Dept: NUCLEAR MEDICINE | Facility: HOSPITAL | Age: 88
Discharge: HOME | End: 2023-01-20
Payer: MEDICARE

## 2023-01-20 DIAGNOSIS — R07.9 CHEST PAIN, UNSPECIFIED TYPE: ICD-10-CM

## 2023-01-20 PROCEDURE — A9500 TC99M SESTAMIBI: HCPCS | Performed by: INTERNAL MEDICINE

## 2023-01-20 PROCEDURE — 0 TECHNETIUM SESTAMIBI: Performed by: INTERNAL MEDICINE

## 2023-01-20 RX ADMIN — TECHNETIUM TC 99M SESTAMIBI 1 DOSE: 1 INJECTION INTRAVENOUS at 07:48

## 2023-01-20 NOTE — NURSING NOTE
Pt held normal BP meds pm 1/19 and am 1/20 per instructions.  BP on arrival 214/76. 190/78 on recheck.  Dr Burton advised to reschedule test and instruct patient not to hold her BP meds for the next test date.  Pt verbalized understanding and will plan to return on Wed at 0900 for her test.

## 2023-01-25 ENCOUNTER — HOSPITAL ENCOUNTER (OUTPATIENT)
Dept: NUCLEAR MEDICINE | Facility: HOSPITAL | Age: 88
Discharge: HOME | End: 2023-01-25
Payer: MEDICARE

## 2023-01-25 ENCOUNTER — HOSPITAL ENCOUNTER (OUTPATIENT)
Dept: CARDIOLOGY | Facility: HOSPITAL | Age: 88
Discharge: HOME | End: 2023-01-25
Payer: MEDICARE

## 2023-01-25 LAB
BH CV REST NUCLEAR ISOTOPE DOSE: 11.3 MCI
BH CV STRESS BP STAGE 1: NORMAL
BH CV STRESS COMMENTS STAGE 1: NORMAL
BH CV STRESS DOSE REGADENOSON STAGE 1: 0.4
BH CV STRESS DURATION MIN STAGE 1: 0
BH CV STRESS DURATION SEC STAGE 1: 30
BH CV STRESS HR STAGE 1: 50
BH CV STRESS NUCLEAR ISOTOPE DOSE: 30.2 MCI
BH CV STRESS O2 STAGE 1: 96
BH CV STRESS PROTOCOL 1: NORMAL
BH CV STRESS RECOVERY BP: NORMAL MMHG
BH CV STRESS RECOVERY HR: 65 BPM
BH CV STRESS RECOVERY O2: 96 %
BH CV STRESS STAGE 1: 1
LV EF NUC BP: 55 %
MAXIMAL PREDICTED HEART RATE: 134 BPM
PERCENT MAX PREDICTED HR: 52.99 %
STRESS BASELINE BP: NORMAL MMHG
STRESS BASELINE HR: 50 BPM
STRESS O2 SAT REST: 96 %
STRESS PERCENT HR: 62 %
STRESS POST ESTIMATED WORKLOAD: 1 METS
STRESS POST EXERCISE DUR SEC: 30 SEC
STRESS POST O2 SAT PEAK: 97 %
STRESS POST PEAK BP: NORMAL MMHG
STRESS POST PEAK HR: 71 BPM
STRESS TARGET HR: 114 BPM

## 2023-01-25 PROCEDURE — 0 TECHNETIUM SESTAMIBI: Performed by: INTERNAL MEDICINE

## 2023-01-25 PROCEDURE — 93017 CV STRESS TEST TRACING ONLY: CPT

## 2023-01-25 PROCEDURE — A9500 TC99M SESTAMIBI: HCPCS | Performed by: INTERNAL MEDICINE

## 2023-01-25 PROCEDURE — 25010000002 REGADENOSON 0.4 MG/5ML SOLUTION: Performed by: INTERNAL MEDICINE

## 2023-01-25 PROCEDURE — 93016 CV STRESS TEST SUPVJ ONLY: CPT | Performed by: INTERNAL MEDICINE

## 2023-01-25 PROCEDURE — 78452 HT MUSCLE IMAGE SPECT MULT: CPT | Performed by: INTERNAL MEDICINE

## 2023-01-25 PROCEDURE — 78452 HT MUSCLE IMAGE SPECT MULT: CPT

## 2023-01-25 PROCEDURE — 93018 CV STRESS TEST I&R ONLY: CPT | Performed by: INTERNAL MEDICINE

## 2023-01-25 RX ADMIN — REGADENOSON 0.4 MG: 0.08 INJECTION, SOLUTION INTRAVENOUS at 09:38

## 2023-01-25 RX ADMIN — TECHNETIUM TC 99M SESTAMIBI 1 DOSE: 1 INJECTION INTRAVENOUS at 09:18

## 2023-01-25 RX ADMIN — TECHNETIUM TC 99M SESTAMIBI 1 DOSE: 1 INJECTION INTRAVENOUS at 09:38

## 2023-01-30 ENCOUNTER — TELEPHONE (OUTPATIENT)
Dept: CARDIOLOGY | Facility: CLINIC | Age: 88
End: 2023-01-30
Payer: MEDICARE

## 2023-01-30 ENCOUNTER — OFFICE VISIT (OUTPATIENT)
Dept: CARDIOLOGY | Facility: CLINIC | Age: 88
End: 2023-01-30
Payer: MEDICARE

## 2023-01-30 VITALS
SYSTOLIC BLOOD PRESSURE: 150 MMHG | BODY MASS INDEX: 22.45 KG/M2 | HEIGHT: 62 IN | DIASTOLIC BLOOD PRESSURE: 70 MMHG | WEIGHT: 122 LBS | OXYGEN SATURATION: 97 % | RESPIRATION RATE: 16 BRPM | HEART RATE: 52 BPM

## 2023-01-30 DIAGNOSIS — R01.1 HEART MURMUR: ICD-10-CM

## 2023-01-30 DIAGNOSIS — I49.3 PVCS (PREMATURE VENTRICULAR CONTRACTIONS): ICD-10-CM

## 2023-01-30 DIAGNOSIS — I10 PRIMARY HYPERTENSION: Primary | ICD-10-CM

## 2023-01-30 DIAGNOSIS — E78.2 MIXED HYPERLIPIDEMIA: ICD-10-CM

## 2023-01-30 DIAGNOSIS — I35.8 AORTIC VALVE SCLEROSIS: ICD-10-CM

## 2023-01-30 DIAGNOSIS — R07.2 PRECORDIAL PAIN: ICD-10-CM

## 2023-01-30 PROCEDURE — 93000 ELECTROCARDIOGRAM COMPLETE: CPT | Performed by: NURSE PRACTITIONER

## 2023-01-30 PROCEDURE — 99214 OFFICE O/P EST MOD 30 MIN: CPT | Performed by: NURSE PRACTITIONER

## 2023-01-30 RX ORDER — ISOSORBIDE MONONITRATE 30 MG/1
30 TABLET, EXTENDED RELEASE ORAL DAILY
Qty: 30 TABLET | Refills: 11 | Status: SHIPPED | OUTPATIENT
Start: 2023-01-30 | End: 2023-02-16

## 2023-01-30 RX ORDER — METOPROLOL SUCCINATE 25 MG/1
TABLET, EXTENDED RELEASE ORAL
COMMUNITY

## 2023-01-30 RX ORDER — ESCITALOPRAM OXALATE 10 MG/1
10 TABLET ORAL DAILY
COMMUNITY

## 2023-01-30 NOTE — TELEPHONE ENCOUNTER
LIBIA/TOD pt/      Dr. Ramos ordered a stress test last week for chest pain, it was done at .  Patient is calling for results.  LOV was with LIBIA 11/18/22.    Thanks!    Sara Georges RN  [facility] Triage  01/30/23 09:16 EST

## 2023-01-30 NOTE — PROGRESS NOTES
Date of Office Visit: 2023  Encounter Provider: SALINAS Silveira  Place of Service: [facility] CARDIOLOGY  Patient Name: Jia Gillis  :1935  Primary Cardiologist: Dr. Burton    CC:  Discuss abnormal stress test    Dear Dr. Ramos    HPI: Jia Gillis is a pleasant 87 y.o. female who presents 2023 for cardiac follow up. I have reviewed her past medical records including notes, labs and testing in preparation for today's visit.    She has a history of hypertension, hyperlipidemia, thyroid disease, history of breast cancer with radiation therapy.  Her breast cancer was first diagnosed in the  on the right side and she had a lumpectomy.  She had a recurrence in  on the right side and had a lumpectomy with radiation therapy and tamoxifen.  She then had a recurrence of breast cancer on the left side in  with a left-sided lumpectomy and no adjuvant therapy.     She is , has 3 children and is retired.  She uses no cigarettes and has social alcohol.  She has 4 cups of coffee per day.  Her sister had a myocardial infarction at the age of 40.  Her mother heart disease and hypertension.  Her son has diabetes.     In 2021, she presented to the [facility] emergency department for evaluation of lightheadedness and near syncope.  She reported that 6 months prior she was experiencing more palpitations her PCP started her on metoprolol succinate 25 mg daily.  She felt worse after starting the medications and felt like her palpitations were stronger and more prominent.  Her metoprolol was decreased to 12.5 mg twice per day and her palpitations did not improve.  She also noticed when she had a glass of wine that this would increase her palpitations.       Dr. Armaan Singleton consulted on her during the hospitalization.  Her thyroid and electrolytes were normal.  Myocardial perfusion study showed no evidence of ischemia and unifocal PVCs stayed the same  during the entire test.  Echocardiogram showed normal LVEF, no LVH or diastolic dysfunction, aortic valve sclerosis, and mild mitral annular calcification.  She was discharged on a 14-day Holter monitor which showed normal sinus rhythm, average heart rate 59, and a total PVC burden of 11.1%.  She had 3 episodes of nonsustained SVT the longest 11 beats in duration and the fastest at a rate of 158.  Her metoprolol was discontinued and she was switched to diltiazem.     She saw Dr. Burton 11/2022.  She has no chest pain.  She has higher blood pressure at home running 140-150 but it was better over the summer when she was exercising doing exercises in the pool.  Currently she is not exercising her blood pressure is gone up.  Of the summer is running more 130-140.  She does not feel her heart racing or skipping.  She has had no dizziness or falls.  She does feel her self getting more weak because she is not exercising.  She has a lot of back pain and the back pain causes her blood pressure to go up and then also causes her not to want to exercise.     You had ordered a stress test with the following results:  1/25/2023 - Interpretation Summary   •  (Calculated EF = 55%).  There is hypokinesis of the inferior wall.  •  Myocardial perfusion imaging indicates a medium-sized infarct located in the inferior wall with no significant ischemia noted.     She presents today to discuss the abnormal stress test.  She states in the last 3 months she is had episodic shortness of breath and chest pressure.  It usually happens after strenuous activity such as dragging both of her garbage cans of her long driveway at the same time or if she has been out raking leaves.  She states it is a pressure versus pain.  She is also noticed increased fatigue.  She states the pressure happens maybe twice a week.  She denies any dizziness, lightheadedness, syncope or presyncopal episodes.  She denies any lower extremity edema, or she has not had  any palpitations.  She is taking her medications as directed.  Her blood pressure is borderline systolic elevated today.    Past Medical History:   Diagnosis Date   • Aortic valve sclerosis    • Heart murmur    • Hyperlipidemia    • Hypertension    • Malignant tumor of breast (HCC)    • Palpitations    • PVCs (premature ventricular contractions)        Past Surgical History:   Procedure Laterality Date   • BREAST BIOPSY     • BREAST SURGERY     • THYROID SURGERY         Social History     Socioeconomic History   • Marital status:    Tobacco Use   • Smoking status: Never   • Smokeless tobacco: Never   • Tobacco comments:     caffeine use   Substance and Sexual Activity   • Alcohol use: Not Currently     Comment: wine twice monthly...social   • Drug use: Never   • Sexual activity: Not Currently     Partners: Male     Birth control/protection: None       Family History   Problem Relation Age of Onset   • Heart disease Mother    • Hypertension Mother    • Hypertension Father    • Cancer Father    • Heart attack Sister    • Diabetes Son        The following portion of the patient's history were reviewed and updated as appropriate: past medical history, past surgical history, past social history, past family history, allergies, current medications, and problem list.    Review of Systems   Constitutional: Positive for malaise/fatigue. Negative for diaphoresis and fever.   HENT: Negative for congestion, hearing loss, hoarse voice, nosebleeds and sore throat.    Eyes: Negative for photophobia, vision loss in left eye, vision loss in right eye and visual disturbance.   Cardiovascular: Positive for chest pain. Negative for dyspnea on exertion, irregular heartbeat, leg swelling, near-syncope, orthopnea, palpitations, paroxysmal nocturnal dyspnea and syncope.   Respiratory: Positive for shortness of breath. Negative for cough, hemoptysis, sleep disturbances due to breathing, snoring, sputum production and wheezing.     Endocrine: Negative for cold intolerance, heat intolerance, polydipsia, polyphagia and polyuria.   Hematologic/Lymphatic: Negative for bleeding problem. Does not bruise/bleed easily.   Skin: Negative for color change, dry skin, poor wound healing, rash and suspicious lesions.   Musculoskeletal: Negative for arthritis, back pain, falls, gout, joint pain, joint swelling, muscle cramps, muscle weakness and myalgias.   Gastrointestinal: Negative for bloating, abdominal pain, constipation, diarrhea, dysphagia, melena, nausea and vomiting.   Neurological: Negative for excessive daytime sleepiness, dizziness, headaches, light-headedness, loss of balance, numbness, paresthesias, seizures, vertigo and weakness.   Psychiatric/Behavioral: Negative for depression, memory loss and substance abuse. The patient is not nervous/anxious.        Allergies   Allergen Reactions   • Sulfa Antibiotics GI Intolerance     NAUSEA         Current Outpatient Medications:   •  dilTIAZem CD (CARDIZEM CD) 300 MG 24 hr capsule, Take 1 capsule by mouth Every Morning., Disp: 90 capsule, Rfl: 3  •  escitalopram (LEXAPRO) 10 MG tablet, Take 10 mg by mouth Daily., Disp: , Rfl:   •  hydroCHLOROthiazide (HYDRODIURIL) 25 MG tablet, Take 1 tablet by mouth Daily., Disp: , Rfl:   •  multivitamin (THERAGRAN) tablet tablet, Take  by mouth Daily., Disp: , Rfl:   •  ramipril (ALTACE) 10 MG capsule, Take 2 capsules by mouth Every Night., Disp: 180 capsule, Rfl: 3  •  rosuvastatin (CRESTOR) 10 MG tablet, Take 1 tablet by mouth Daily., Disp: , Rfl:   •  isosorbide mononitrate (IMDUR) 30 MG 24 hr tablet, Take 1 tablet by mouth Daily., Disp: 30 tablet, Rfl: 11  •  metoprolol succinate XL (TOPROL-XL) 25 MG 24 hr tablet, metoprolol succinate ER 25 mg tablet,extended release 24 hr  TAKE 1 TABLET BY MOUTH EVERY DAY IN THE EVENING, Disp: , Rfl:         Objective:     Vitals:    01/30/23 1250   BP: 150/70   Pulse: 52   Resp: 16   SpO2: 97%   Weight: 55.3 kg (122 lb)  "  Height: 157.5 cm (62\")     Body mass index is 22.31 kg/m².      Vitals reviewed.   Constitutional:       General: Not in acute distress.     Appearance: Well-developed and not in distress.   Eyes:      General:         Right eye: No discharge.         Left eye: No discharge.      Conjunctiva/sclera: Conjunctivae normal.   HENT:      Head: Normocephalic and atraumatic.      Right Ear: External ear normal.      Left Ear: External ear normal.      Nose: Nose normal.   Neck:      Thyroid: No thyromegaly.      Vascular: No JVD.      Trachea: No tracheal deviation.      Lymphadenopathy: No cervical adenopathy.   Pulmonary:      Effort: Pulmonary effort is normal. No respiratory distress.      Breath sounds: Normal breath sounds. No wheezing. No rales.   Chest:      Chest wall: Not tender to palpatation.   Cardiovascular:      Normal rate. Regular rhythm.      Murmurs: There is a systolic murmur.      No gallop.   Pulses:     Intact distal pulses.   Edema:     Peripheral edema absent.   Abdominal:      General: There is no distension.      Palpations: Abdomen is soft.      Tenderness: There is no abdominal tenderness.   Musculoskeletal: Normal range of motion.         General: No tenderness or deformity.      Cervical back: Normal range of motion and neck supple. Skin:     General: Skin is warm and dry.      Findings: No erythema or rash.   Neurological:      Mental Status: Alert and oriented to person, place, and time.      Coordination: Coordination normal.   Psychiatric:         Attention and Perception: Attention normal.         Behavior: Behavior normal.         Thought Content: Thought content normal.         Cognition and Memory: Cognition normal.         Judgment: Judgment normal.               ECG 12 Lead    Date/Time: 1/30/2023 12:56 PM  Performed by: Earline Juan APRN  Authorized by: Earline Juan APRN   Comparison: compared with previous ECG from 11/18/2022  Similar to previous ECG  Rhythm: sinus " rhythm  Rate: normal  Conduction: conduction normal  ST Segments: ST segments normal  T inversion: III  T flattening: aVF  QRS axis: normal  Other findings: non-specific ST-T wave changes  Other findings comments: unchanged from prior EKG    Clinical impression: non-specific ECG              Assessment:       Diagnosis Plan   1. Primary hypertension        2. Mixed hyperlipidemia        3. PVCs (premature ventricular contractions)        4. Aortic valve sclerosis        5. Heart murmur        6. Precordial pain  Adult Transthoracic Echo Complete W/ Cont if Necessary Per Protocol             Plan:       1. Hypertension, goal less than 120/80.    Her blood pressure checks at home are mid 140s.  Mildly elevated today.    2. Hyperlipidemia - continue on lipid-lowering therapy.  She is currently on rosuvastatin 10 mg daily.  3. PVCs, on diltiazem with good control.  4. H/o breast cancer with radiation.   5. Back pain which increases her blood pressure and causes her to exercise less.  6.  Precordial pain/abnormal stress test- episodic, mostly with increased exertional activity.  We will check an echo.  I am going to start isosorbide mononitrate 30 mg daily.  This will also help with her blood pressure.    Check stress test, start Imdur 30 mg daily      As always, it has been a pleasure to participate in your patient's care. Thank you.       Sincerely,       SALINAS Silveira      Current Outpatient Medications:   •  dilTIAZem CD (CARDIZEM CD) 300 MG 24 hr capsule, Take 1 capsule by mouth Every Morning., Disp: 90 capsule, Rfl: 3  •  escitalopram (LEXAPRO) 10 MG tablet, Take 10 mg by mouth Daily., Disp: , Rfl:   •  hydroCHLOROthiazide (HYDRODIURIL) 25 MG tablet, Take 1 tablet by mouth Daily., Disp: , Rfl:   •  multivitamin (THERAGRAN) tablet tablet, Take  by mouth Daily., Disp: , Rfl:   •  ramipril (ALTACE) 10 MG capsule, Take 2 capsules by mouth Every Night., Disp: 180 capsule, Rfl: 3  •  rosuvastatin (CRESTOR) 10 MG  tablet, Take 1 tablet by mouth Daily., Disp: , Rfl:   •  isosorbide mononitrate (IMDUR) 30 MG 24 hr tablet, Take 1 tablet by mouth Daily., Disp: 30 tablet, Rfl: 11  •  metoprolol succinate XL (TOPROL-XL) 25 MG 24 hr tablet, metoprolol succinate ER 25 mg tablet,extended release 24 hr  TAKE 1 TABLET BY MOUTH EVERY DAY IN THE EVENING, Disp: , Rfl:       Dictated utilizing Dragon dictation

## 2023-01-30 NOTE — TELEPHONE ENCOUNTER
Called and spoke with the patient.  Offered her an appt for [facility], but she is concerned about driving in possible inclement weather.  Added her on to  clinic today at 1245.  I emailed medical records to hopefully get a chart for this pt.      Thanks everyone!    Sara Georges RN  [facility] Triage  01/30/23 09:37 EST

## 2023-01-30 NOTE — TELEPHONE ENCOUNTER
She needs an appt with brandy in [place] for abnormal stress this week if possible - she has to call chris about results if he ordered but we need to see her.

## 2023-02-15 ENCOUNTER — HOSPITAL ENCOUNTER (OUTPATIENT)
Dept: CARDIOLOGY | Facility: HOSPITAL | Age: 88
Discharge: HOME | End: 2023-02-15
Admitting: NURSE PRACTITIONER
Payer: MEDICARE

## 2023-02-15 VITALS
BODY MASS INDEX: 22.31 KG/M2 | SYSTOLIC BLOOD PRESSURE: 155 MMHG | HEART RATE: 59 BPM | DIASTOLIC BLOOD PRESSURE: 67 MMHG | HEIGHT: 62 IN | WEIGHT: 121.25 LBS

## 2023-02-15 DIAGNOSIS — R07.2 PRECORDIAL PAIN: ICD-10-CM

## 2023-02-15 LAB
AORTIC DIMENSIONLESS INDEX: 0.6 (DI)
BH CV ECHO MEAS - ACS: 1.45 CM
BH CV ECHO MEAS - AO MAX PG: 10.8 MMHG
BH CV ECHO MEAS - AO MEAN PG: 6 MMHG
BH CV ECHO MEAS - AO V2 MAX: 164 CM/SEC
BH CV ECHO MEAS - AO V2 VTI: 41.6 CM
BH CV ECHO MEAS - AVA(I,D): 1.56 CM2
BH CV ECHO MEAS - EDV(CUBED): 103.8 ML
BH CV ECHO MEAS - EDV(MOD-SP2): 53 ML
BH CV ECHO MEAS - EDV(MOD-SP4): 66 ML
BH CV ECHO MEAS - EF(MOD-BP): 56.8 %
BH CV ECHO MEAS - EF(MOD-SP2): 49.1 %
BH CV ECHO MEAS - EF(MOD-SP4): 60.6 %
BH CV ECHO MEAS - EF_3D-VOL: 54 %
BH CV ECHO MEAS - ESV(CUBED): 38.2 ML
BH CV ECHO MEAS - ESV(MOD-SP2): 27 ML
BH CV ECHO MEAS - ESV(MOD-SP4): 26 ML
BH CV ECHO MEAS - FS: 28.4 %
BH CV ECHO MEAS - IVS/LVPW: 1.13 CM
BH CV ECHO MEAS - IVSD: 0.9 CM
BH CV ECHO MEAS - LAT PEAK E' VEL: 8.6 CM/SEC
BH CV ECHO MEAS - LV DIASTOLIC VOL/BSA (35-75): 42.8 CM2
BH CV ECHO MEAS - LV MASS(C)D: 132.3 GRAMS
BH CV ECHO MEAS - LV MAX PG: 4.2 MMHG
BH CV ECHO MEAS - LV MEAN PG: 2 MMHG
BH CV ECHO MEAS - LV SYSTOLIC VOL/BSA (12-30): 16.9 CM2
BH CV ECHO MEAS - LV V1 MAX: 103 CM/SEC
BH CV ECHO MEAS - LV V1 VTI: 25.8 CM
BH CV ECHO MEAS - LVIDD: 4.7 CM
BH CV ECHO MEAS - LVIDS: 3.4 CM
BH CV ECHO MEAS - LVOT AREA: 2.5 CM2
BH CV ECHO MEAS - LVOT DIAM: 1.79 CM
BH CV ECHO MEAS - LVPWD: 0.8 CM
BH CV ECHO MEAS - MED PEAK E' VEL: 7.4 CM/SEC
BH CV ECHO MEAS - MR MAX PG: 147.1 MMHG
BH CV ECHO MEAS - MR MAX VEL: 606.4 CM/SEC
BH CV ECHO MEAS - MR MEAN PG: 93 MMHG
BH CV ECHO MEAS - MR MEAN VEL: 460.4 CM/SEC
BH CV ECHO MEAS - MR VTI: 220.5 CM
BH CV ECHO MEAS - MV A MAX VEL: 98.5 CM/SEC
BH CV ECHO MEAS - MV DEC SLOPE: 702.5 CM/SEC2
BH CV ECHO MEAS - MV DEC TIME: 0.17 MSEC
BH CV ECHO MEAS - MV E MAX VEL: 119 CM/SEC
BH CV ECHO MEAS - MV E/A: 1.21
BH CV ECHO MEAS - MV MAX PG: 8.1 MMHG
BH CV ECHO MEAS - MV MEAN PG: 2.22 MMHG
BH CV ECHO MEAS - MV P1/2T: 62.7 MSEC
BH CV ECHO MEAS - MV V2 VTI: 45.9 CM
BH CV ECHO MEAS - MVA(P1/2T): 3.5 CM2
BH CV ECHO MEAS - MVA(VTI): 1.42 CM2
BH CV ECHO MEAS - PA ACC TIME: 0.17 SEC
BH CV ECHO MEAS - PA PR(ACCEL): 2.9 MMHG
BH CV ECHO MEAS - PA V2 MAX: 108 CM/SEC
BH CV ECHO MEAS - RAP SYSTOLE: 8 MMHG
BH CV ECHO MEAS - RV MAX PG: 2.15 MMHG
BH CV ECHO MEAS - RV V1 MAX: 73.4 CM/SEC
BH CV ECHO MEAS - RV V1 VTI: 18 CM
BH CV ECHO MEAS - RVSP: 42.7 MMHG
BH CV ECHO MEAS - SI(MOD-SP2): 16.9 ML/M2
BH CV ECHO MEAS - SI(MOD-SP4): 25.9 ML/M2
BH CV ECHO MEAS - SV(LVOT): 65 ML
BH CV ECHO MEAS - SV(MOD-SP2): 26 ML
BH CV ECHO MEAS - SV(MOD-SP4): 40 ML
BH CV ECHO MEAS - TR MAX PG: 34.7 MMHG
BH CV ECHO MEAS - TR MAX VEL: 294.6 CM/SEC
BH CV ECHO MEASUREMENTS AVERAGE E/E' RATIO: 14.88
BH CV XLRA - RV BASE: 3.1 CM
BH CV XLRA - RV LENGTH: 7.2 CM
BH CV XLRA - RV MID: 1.86 CM
BH CV XLRA - TDI S': 14.1 CM/SEC
LEFT ATRIUM VOLUME INDEX: 38.3 ML/M2
MAXIMAL PREDICTED HEART RATE: 134 BPM
SINUS: 2.5 CM
STRESS TARGET HR: 114 BPM

## 2023-02-15 PROCEDURE — 93306 TTE W/DOPPLER COMPLETE: CPT

## 2023-02-15 PROCEDURE — 93306 TTE W/DOPPLER COMPLETE: CPT | Performed by: INTERNAL MEDICINE

## 2023-02-16 ENCOUNTER — TELEPHONE (OUTPATIENT)
Dept: CARDIOLOGY | Facility: CLINIC | Age: 88
End: 2023-02-16
Payer: MEDICARE

## 2023-02-16 RX ORDER — ISOSORBIDE MONONITRATE 30 MG/1
60 TABLET, EXTENDED RELEASE ORAL DAILY
Qty: 60 TABLET | Refills: 11 | Status: SHIPPED | OUTPATIENT
Start: 2023-02-16 | End: 2023-03-14 | Stop reason: SDUPTHER

## 2023-02-16 NOTE — TELEPHONE ENCOUNTER
Please let her know that her echo looks ok. The heart muscle is a little stiff which can happen with high BP, weight, diabetes, etc. Her mitral and aortic valves are a little leaky.     How is her BP?  Is she having any chest pain after starting Imdur?  Does she have any leg swelling or shortness or breath?    Thanks!  SALINAS Higgins

## 2023-02-16 NOTE — TELEPHONE ENCOUNTER
I spoke with Jia Gillis and updated pt on recommendations from provider.  Pt verbalized understanding and has no further questions at this time.    I scheduled her an appt to see SALINAS Silveira on 3/2/23.    Thank you,    Anna Guy, RN  Triage [facility]  02/16/23 15:30 EST

## 2023-02-16 NOTE — TELEPHONE ENCOUNTER
Have her increase Imdur to 60 mg daily. I would schedule her to see Misael in 2 weeks.     Thanks!  SALINAS Higgins

## 2023-02-16 NOTE — TELEPHONE ENCOUNTER
I spoke with Jia Gillis and updated pt on results from provider.  Pt verbalized understanding and has no further questions at this time.    Pt reports that her SBP will range from higher to lower frequently.  She read off some SBP of 179, 165, 150s, and then as low as 128.  She said on average it's hanging around 140s/60s, HR around 55.  She says the Imdur has helped with the CP because its not as sharp but she does experience some pressure still.  She denies any swelling or SOA.    Thank you,    Anna Guy, RN  Triage [facility]  02/16/23 14:45 EST

## 2023-02-17 NOTE — TELEPHONE ENCOUNTER
I may have been the Imdur; she can try taking 30 mg in the morning and 30 mg in the evening.     Thanks!  SALINAS Higgins

## 2023-02-17 NOTE — TELEPHONE ENCOUNTER
Very sweet patient who is calling after experience what she thinks may have been a reaction to the increase in Imdur.  Patient was increased from Imdur 30 mg to 60 mg and she took the first dose this morning about 0800.  About  am she became very lightheaded sitting in a a chair at the computer, felt 'floaty.' She felt as if she could not get up.  When it passed somewhat, about 10-15 minutes later, she felt good enough to get up and check her BP.  BP was 123/53 HR 50.  She says that is a pretty low BP for her.  She feels better now, back to baseline.    She feels fine now.  She is just calling to see if that is too much medication, or if this is just normal and she should just continue and give it a few more days?      Sara Georges RN  [facility] Triage  02/17/23 11:58 EST

## 2023-02-17 NOTE — TELEPHONE ENCOUNTER
Called and spoke with Jia.  Let her know to take Imdur 30 mg in morning and 30 mg in evening.  She verbalizes understanding and is agreeable.  I asked her to start tomorrow since she has already taken 60 mg today.    Sara Georges RN  [facility] Triage  02/17/23 13:04 EST

## 2023-03-01 RX ORDER — DILTIAZEM HYDROCHLORIDE 300 MG/1
300 CAPSULE, COATED, EXTENDED RELEASE ORAL EVERY MORNING
Qty: 90 CAPSULE | Refills: 3 | Status: SHIPPED | OUTPATIENT
Start: 2023-03-01

## 2023-03-01 NOTE — TELEPHONE ENCOUNTER
Failed Protocol    NOV-03/02/23-JF  LOV-01/30/23-Jf    Plan:       1. Hypertension, goal less than 120/80.    Her blood pressure checks at home are mid 140s.  Mildly elevated today.    2. Hyperlipidemia - continue on lipid-lowering therapy.  She is currently on rosuvastatin 10 mg daily.  3. PVCs, on diltiazem with good control.  4. H/o breast cancer with radiation.   5. Back pain which increases her blood pressure and causes her to exercise less.  6.  Precordial pain/abnormal stress test- episodic, mostly with increased exertional activity.  We will check an echo.  I am going to start isosorbide mononitrate 30 mg daily.  This will also help with her blood pressure.     Check stress test, start Imdur 30 mg daily     No recent labs

## 2023-03-02 ENCOUNTER — OFFICE VISIT (OUTPATIENT)
Dept: CARDIOLOGY | Facility: CLINIC | Age: 88
End: 2023-03-02
Payer: MEDICARE

## 2023-03-02 VITALS
BODY MASS INDEX: 22.45 KG/M2 | HEIGHT: 62 IN | SYSTOLIC BLOOD PRESSURE: 146 MMHG | HEART RATE: 56 BPM | DIASTOLIC BLOOD PRESSURE: 62 MMHG | WEIGHT: 122 LBS

## 2023-03-02 DIAGNOSIS — I35.8 AORTIC VALVE SCLEROSIS: ICD-10-CM

## 2023-03-02 DIAGNOSIS — I10 PRIMARY HYPERTENSION: Primary | ICD-10-CM

## 2023-03-02 DIAGNOSIS — E78.2 MIXED HYPERLIPIDEMIA: ICD-10-CM

## 2023-03-02 DIAGNOSIS — R01.1 HEART MURMUR: ICD-10-CM

## 2023-03-02 DIAGNOSIS — I49.3 PVCS (PREMATURE VENTRICULAR CONTRACTIONS): ICD-10-CM

## 2023-03-02 DIAGNOSIS — R00.2 PALPITATIONS: ICD-10-CM

## 2023-03-02 PROCEDURE — 99214 OFFICE O/P EST MOD 30 MIN: CPT | Performed by: NURSE PRACTITIONER

## 2023-03-02 PROCEDURE — 93000 ELECTROCARDIOGRAM COMPLETE: CPT | Performed by: NURSE PRACTITIONER

## 2023-03-02 NOTE — PROGRESS NOTES
Date of Office Visit: 2023  Encounter Provider: SALINAS Silveira  Place of Service: [facility] CARDIOLOGY  Patient Name: Jia Gillis  :1935  Primary Cardiologist: Dr. Burton    CC:  2 week follow up    Dear Dr. Ramos    HPI: Jia Gillis is a pleasant 87 y.o. female who presents 2023 for cardiac follow up. I have reviewed her past medical records including notes, labs and testing in preparation for today's visit.    She has a history of hypertension, hyperlipidemia, thyroid disease, history of breast cancer with radiation therapy.  Her breast cancer was first diagnosed in the  on the right side and she had a lumpectomy.  She had a recurrence in  on the right side and had a lumpectomy with radiation therapy and tamoxifen.  She then had a recurrence of breast cancer on the left side in  with a left-sided lumpectomy and no adjuvant therapy.     She is , has 3 children and is retired.  She uses no cigarettes and has social alcohol.  She has 4 cups of coffee per day.  Her sister had a myocardial infarction at the age of 40.  Her mother heart disease and hypertension.  Her son has diabetes.     In 2021, she presented to the [facility] emergency department for evaluation of lightheadedness and near syncope.  She reported that 6 months prior she was experiencing more palpitations her PCP started her on metoprolol succinate 25 mg daily.  She felt worse after starting the medications and felt like her palpitations were stronger and more prominent.  Her metoprolol was decreased to 12.5 mg twice per day and her palpitations did not improve.  She also noticed when she had a glass of wine that this would increase her palpitations.       Dr. Armaan Singleton consulted on her during the hospitalization.  Her thyroid and electrolytes were normal.  Myocardial perfusion study showed no evidence of ischemia and unifocal PVCs stayed the same during the  entire test.  Echocardiogram showed normal LVEF, no LVH or diastolic dysfunction, aortic valve sclerosis, and mild mitral annular calcification.  She was discharged on a 14-day Holter monitor which showed normal sinus rhythm, average heart rate 59, and a total PVC burden of 11.1%.  She had 3 episodes of nonsustained SVT the longest 11 beats in duration and the fastest at a rate of 158.  Her metoprolol was discontinued and she was switched to diltiazem.     She saw Dr. Burton 11/2022.  She has no chest pain.  She has higher blood pressure at home running 140-150 but it was better over the summer when she was exercising doing exercises in the pool.  Currently she is not exercising her blood pressure is gone up.  Of the summer is running more 130-140.  She does not feel her heart racing or skipping.  She has had no dizziness or falls.  She does feel her self getting more weak because she is not exercising.  She has a lot of back pain and the back pain causes her blood pressure to go up and then also causes her not to want to exercise.     You had ordered a stress test with the following results:  1/25/2023 - Interpretation Summary   •  (Calculated EF = 55%).  There is hypokinesis of the inferior wall.  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     I saw her 1/30/2023 to discuss the abnormal stress test.  She states in the last 3 months she is had episodic shortness of breath and chest pressure.  It usually happens after strenuous activity such as dragging both of her garbage cans of her long driveway at the same time or if she has been out raking leaves.  She states it is a pressure versus pain.  She is also noticed increased fatigue.  She states the pressure happens maybe twice a week.  She denies any dizziness, lightheadedness, syncope or presyncopal episodes.  She denies any lower extremity edema, or she has not had any  palpitations.  She is taking her medications as directed.  Her blood pressure is borderline systolic elevated today.  An echo was ordered and she wa started on Imdur.    2/15/2023 Interpretation Summary  •  Left ventricular systolic function is normal. Calculated left ventricular EF = 56.8%  •  Left ventricular diastolic function is consistent with (grade II w/high LAP) pseudonormalization.  •  The aortic valve exhibits sclerosis.  Mild aortic valve regurgitation is present. No hemodynamically significant aortic valve stenosis is present.  •  There is mild bileaflet mitral valve prolapse present. Mild to moderate mitral valve regurgitation is present. No significant mitral valve stenosis is present.  •  Calculated right ventricular systolic pressure from tricuspid regurgitation is 43 mmHg.     She returns today and is accompanied by her daughter.  She states all in all she feels quite well.  She states yesterday she went out in her yard and cleaned out her flower bed and flowerpots and use the weedeater and can do all that without shortness of breath or chest pain.  Occasionally she will feel some palpitations.  She denies any lower extremity edema, dizziness or lightheadedness.  She has not had any syncope or presyncopal episodes.  She denies any fatigue.  She brings a list of her blood pressures that are consistently 120/58-62.  I did recheck her blood pressure while in the office 132/62.  She is taking her medications as directed.  She is having some indigestion and I have asked her to follow-up with you.  She does not take anything on a consistent basis.     Past Medical History:   Diagnosis Date   • Aortic valve sclerosis    • Heart murmur    • Hyperlipidemia    • Hypertension    • Malignant tumor of breast (HCC)    • Palpitations    • PVCs (premature ventricular contractions)        Past Surgical History:   Procedure Laterality Date   • BREAST BIOPSY     • BREAST SURGERY     • THYROID SURGERY         Social  History     Socioeconomic History   • Marital status:    Tobacco Use   • Smoking status: Never     Passive exposure: Never   • Smokeless tobacco: Never   • Tobacco comments:     caffeine use   Substance and Sexual Activity   • Alcohol use: Not Currently     Comment: wine twice monthly...social   • Drug use: Never   • Sexual activity: Not Currently     Partners: Male     Birth control/protection: None       Family History   Problem Relation Age of Onset   • Heart disease Mother    • Hypertension Mother    • Hypertension Father    • Cancer Father    • Heart attack Sister    • Diabetes Son        The following portion of the patient's history were reviewed and updated as appropriate: past medical history, past surgical history, past social history, past family history, allergies, current medications, and problem list.    Review of Systems   Constitutional: Negative for diaphoresis, fever and malaise/fatigue.   HENT: Negative for congestion, hearing loss, hoarse voice, nosebleeds and sore throat.    Eyes: Negative for photophobia, vision loss in left eye, vision loss in right eye and visual disturbance.   Cardiovascular: Positive for palpitations (occ). Negative for chest pain, dyspnea on exertion, irregular heartbeat, leg swelling, near-syncope, orthopnea, paroxysmal nocturnal dyspnea and syncope.   Respiratory: Negative for cough, hemoptysis, shortness of breath, sleep disturbances due to breathing, snoring, sputum production and wheezing.    Endocrine: Negative for cold intolerance, heat intolerance, polydipsia, polyphagia and polyuria.   Hematologic/Lymphatic: Negative for bleeding problem. Does not bruise/bleed easily.   Skin: Negative for color change, dry skin, poor wound healing, rash and suspicious lesions.   Musculoskeletal: Negative for arthritis, back pain, falls, gout, joint pain, joint swelling, muscle cramps, muscle weakness and myalgias.   Gastrointestinal: Positive for flatus and heartburn.  "Negative for bloating, abdominal pain, constipation, diarrhea, dysphagia, melena, nausea and vomiting.   Neurological: Negative for excessive daytime sleepiness, dizziness, headaches, light-headedness, loss of balance, numbness, paresthesias, seizures, vertigo and weakness.   Psychiatric/Behavioral: Negative for depression, memory loss and substance abuse. The patient is not nervous/anxious.        Allergies   Allergen Reactions   • Sulfa Antibiotics GI Intolerance     NAUSEA         Current Outpatient Medications:   •  dilTIAZem CD (CARDIZEM CD) 300 MG 24 hr capsule, TAKE 1 CAPSULE BY MOUTH EVERY MORNING, Disp: 90 capsule, Rfl: 3  •  escitalopram (LEXAPRO) 10 MG tablet, Take 1 tablet by mouth Daily., Disp: , Rfl:   •  hydroCHLOROthiazide (HYDRODIURIL) 25 MG tablet, Take 1 tablet by mouth Daily. In AM, Disp: , Rfl:   •  isosorbide mononitrate (IMDUR) 30 MG 24 hr tablet, Take 2 tablets by mouth Daily. (Patient taking differently: Take 1 tablet by mouth 2 (Two) Times a Day.), Disp: 60 tablet, Rfl: 11  •  metoprolol succinate XL (TOPROL-XL) 25 MG 24 hr tablet, metoprolol succinate ER 25 mg tablet,extended release 24 hr  TAKE 1 TABLET BY MOUTH EVERY DAY IN THE EVENING, Disp: , Rfl:   •  multivitamin (THERAGRAN) tablet tablet, Take  by mouth Daily., Disp: , Rfl:   •  ramipril (ALTACE) 10 MG capsule, Take 2 capsules by mouth Every Night., Disp: 180 capsule, Rfl: 3  •  rosuvastatin (CRESTOR) 10 MG tablet, Take 1 tablet by mouth Daily., Disp: , Rfl:         Objective:     Vitals:    03/02/23 1127   BP: 146/62   Pulse: 56   Weight: 55.3 kg (122 lb)   Height: 157.5 cm (62\")     Body mass index is 22.31 kg/m².      Vitals reviewed.   Constitutional:       General: Not in acute distress.     Appearance: Well-developed and not in distress.   Eyes:      General:         Right eye: No discharge.         Left eye: No discharge.      Conjunctiva/sclera: Conjunctivae normal.   HENT:      Head: Normocephalic and atraumatic.      Right " Ear: External ear normal.      Left Ear: External ear normal.      Nose: Nose normal.   Neck:      Thyroid: No thyromegaly.      Vascular: No JVD.      Trachea: No tracheal deviation.      Lymphadenopathy: No cervical adenopathy.   Pulmonary:      Effort: Pulmonary effort is normal. No respiratory distress.      Breath sounds: Normal breath sounds. No wheezing. No rales.   Chest:      Chest wall: Not tender to palpatation.   Cardiovascular:      Normal rate. Regular rhythm.      Murmurs: There is a systolic murmur.      No gallop.   Pulses:     Intact distal pulses.   Edema:     Peripheral edema absent.   Abdominal:      General: There is no distension.      Palpations: Abdomen is soft.      Tenderness: There is no abdominal tenderness.   Musculoskeletal: Normal range of motion.         General: No tenderness or deformity.      Cervical back: Normal range of motion and neck supple. Skin:     General: Skin is warm and dry.      Findings: No erythema or rash.   Neurological:      Mental Status: Alert and oriented to person, place, and time.      Coordination: Coordination normal.   Psychiatric:         Attention and Perception: Attention normal.         Behavior: Behavior normal.         Thought Content: Thought content normal.         Cognition and Memory: Cognition normal.         Judgment: Judgment normal.               ECG 12 Lead    Date/Time: 3/2/2023 4:40 PM  Performed by: Earline Juan APRN  Authorized by: Earline Juan APRN   Comparison: compared with previous ECG from 1/30/2023  Similar to previous ECG  Rhythm: sinus rhythm  Rate: normal  Conduction: conduction normal  ST Segments: ST segments normal  T inversion: III and aVF  QRS axis: normal    Clinical impression: non-specific ECG              Assessment:       Diagnosis Plan   1. Primary hypertension        2. Mixed hyperlipidemia        3. Palpitations        4. PVCs (premature ventricular contractions)        5. Heart murmur        6. Aortic valve  sclerosis               Plan:         1. Hypertension, goal less than 120/80.  Recheck today in the office 132/62.  She brings her home readings that are consistently 120/58-62.  2. Hyperlipidemia - continue on lipid-lowering therapy.  She is currently on rosuvastatin 10 mg daily.  3. PVCs -occasionally feels some palpitations but overall well controlled.  She remains on metoprolol and diltiazem.  4. H/o breast cancer with radiation.   5. Back pain which increases her blood pressure and causes her to exercise less.  6.  Precordial pain/abnormal stress test- -she states she really is not feeling any more of that and the isosorbide has helped a great deal.  She does take 30 mg twice a day.  She cannot take 60 mg all at once as it caused her dizziness.    Doing well  RTO in 2 months with J F  4 months with RM    As always, it has been a pleasure to participate in your patient's care. Thank you.       Sincerely,       SALINAS Silveira      Current Outpatient Medications:   •  dilTIAZem CD (CARDIZEM CD) 300 MG 24 hr capsule, TAKE 1 CAPSULE BY MOUTH EVERY MORNING, Disp: 90 capsule, Rfl: 3  •  escitalopram (LEXAPRO) 10 MG tablet, Take 1 tablet by mouth Daily., Disp: , Rfl:   •  hydroCHLOROthiazide (HYDRODIURIL) 25 MG tablet, Take 1 tablet by mouth Daily. In AM, Disp: , Rfl:   •  isosorbide mononitrate (IMDUR) 30 MG 24 hr tablet, Take 2 tablets by mouth Daily. (Patient taking differently: Take 1 tablet by mouth 2 (Two) Times a Day.), Disp: 60 tablet, Rfl: 11  •  metoprolol succinate XL (TOPROL-XL) 25 MG 24 hr tablet, metoprolol succinate ER 25 mg tablet,extended release 24 hr  TAKE 1 TABLET BY MOUTH EVERY DAY IN THE EVENING, Disp: , Rfl:   •  multivitamin (THERAGRAN) tablet tablet, Take  by mouth Daily., Disp: , Rfl:   •  ramipril (ALTACE) 10 MG capsule, Take 2 capsules by mouth Every Night., Disp: 180 capsule, Rfl: 3  •  rosuvastatin (CRESTOR) 10 MG tablet, Take 1 tablet by mouth Daily., Disp: , Rfl:       Dictated  utilizing Dragon dictation

## 2023-03-14 RX ORDER — ISOSORBIDE MONONITRATE 30 MG/1
60 TABLET, EXTENDED RELEASE ORAL DAILY
Qty: 60 TABLET | Refills: 11 | Status: SHIPPED | OUTPATIENT
Start: 2023-03-14

## 2023-03-14 RX ORDER — RAMIPRIL 10 MG/1
20 CAPSULE ORAL NIGHTLY
Qty: 180 CAPSULE | Refills: 3 | Status: SHIPPED | OUTPATIENT
Start: 2023-03-14

## 2023-05-23 ENCOUNTER — OFFICE VISIT (OUTPATIENT)
Dept: CARDIOLOGY | Facility: CLINIC | Age: 88
End: 2023-05-23
Payer: MEDICARE

## 2023-05-23 VITALS
WEIGHT: 119 LBS | HEIGHT: 62 IN | OXYGEN SATURATION: 98 % | SYSTOLIC BLOOD PRESSURE: 140 MMHG | HEART RATE: 58 BPM | RESPIRATION RATE: 16 BRPM | DIASTOLIC BLOOD PRESSURE: 70 MMHG | BODY MASS INDEX: 21.9 KG/M2

## 2023-05-23 DIAGNOSIS — I49.3 PVCS (PREMATURE VENTRICULAR CONTRACTIONS): ICD-10-CM

## 2023-05-23 DIAGNOSIS — R01.1 HEART MURMUR: ICD-10-CM

## 2023-05-23 DIAGNOSIS — I35.8 AORTIC VALVE SCLEROSIS: Primary | ICD-10-CM

## 2023-05-23 DIAGNOSIS — I10 PRIMARY HYPERTENSION: ICD-10-CM

## 2023-05-23 DIAGNOSIS — E78.2 MIXED HYPERLIPIDEMIA: ICD-10-CM

## 2023-05-23 PROCEDURE — 93000 ELECTROCARDIOGRAM COMPLETE: CPT | Performed by: NURSE PRACTITIONER

## 2023-05-23 PROCEDURE — 1159F MED LIST DOCD IN RCRD: CPT | Performed by: NURSE PRACTITIONER

## 2023-05-23 PROCEDURE — 1160F RVW MEDS BY RX/DR IN RCRD: CPT | Performed by: NURSE PRACTITIONER

## 2023-05-23 PROCEDURE — 99214 OFFICE O/P EST MOD 30 MIN: CPT | Performed by: NURSE PRACTITIONER

## 2023-05-23 NOTE — PROGRESS NOTES
Date of Office Visit: 2023  Encounter Provider: SALINAS Silveira  Place of Service: [facility] CARDIOLOGY  Patient Name: Jia Gillis  :1935  Primary Cardiologist: Dr. Burton    CC:  3 month follow up    Dear Dr. Ramos    HPI: Jia Gillis is a pleasant 87 y.o. female who presents 2023 for cardiac follow up.  I reviewed her past medical records including notes, labs and testing in preparation for today's visit.    She has a history of hypertension, hyperlipidemia, thyroid disease, history of breast cancer with radiation therapy.  Her breast cancer was first diagnosed in the  on the right side and she had a lumpectomy.  She had a recurrence in  on the right side and had a lumpectomy with radiation therapy and tamoxifen.  She then had a recurrence of breast cancer on the left side in  with a left-sided lumpectomy and no adjuvant therapy.     She is , has 3 children and is retired.  She uses no cigarettes and has social alcohol.  She has 4 cups of coffee per day.  Her sister had a myocardial infarction at the age of 40.  Her mother heart disease and hypertension.  Her son has diabetes.     In 2021, she presented to the [facility] emergency department for evaluation of lightheadedness and near syncope.  She reported that 6 months prior she was experiencing more palpitations her PCP started her on metoprolol succinate 25 mg daily.  She felt worse after starting the medications and felt like her palpitations were stronger and more prominent.  Her metoprolol was decreased to 12.5 mg twice per day and her palpitations did not improve.  She also noticed when she had a glass of wine that this would increase her palpitations.       Dr. Armaan Singleton consulted on her during the hospitalization.  Her thyroid and electrolytes were normal.  Myocardial perfusion study showed no evidence of ischemia and unifocal PVCs stayed the same during the  entire test.  Echocardiogram showed normal LVEF, no LVH or diastolic dysfunction, aortic valve sclerosis, and mild mitral annular calcification.  She was discharged on a 14-day Holter monitor which showed normal sinus rhythm, average heart rate 59, and a total PVC burden of 11.1%.  She had 3 episodes of nonsustained SVT the longest 11 beats in duration and the fastest at a rate of 158.  Her metoprolol was discontinued and she was switched to diltiazem.     She saw Dr. Burton 11/2022.  She has no chest pain.  She has higher blood pressure at home running 140-150 but it was better over the summer when she was exercising doing exercises in the pool.  Currently she is not exercising her blood pressure is gone up.  Of the summer is running more 130-140.  She does not feel her heart racing or skipping.  She has had no dizziness or falls.  She does feel her self getting more weak because she is not exercising.  She has a lot of back pain and the back pain causes her blood pressure to go up and then also causes her not to want to exercise.     You had ordered a stress test with the following results:  1/25/2023 - Interpretation Summary   •  (Calculated EF = 55%).  There is hypokinesis of the inferior wall.  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     I saw her 1/30/2023 to discuss the abnormal stress test.  She states in the last 3 months she is had episodic shortness of breath and chest pressure.  It usually happens after strenuous activity such as dragging both of her garbage cans of her long driveway at the same time or if she has been out raking leaves.  She states it is a pressure versus pain.  She is also noticed increased fatigue.  She states the pressure happens maybe twice a week.  She denies any dizziness, lightheadedness, syncope or presyncopal episodes.  She denies any lower extremity edema, or she has not had any  palpitations.  She is taking her medications as directed.  Her blood pressure is borderline systolic elevated today.  An echo was ordered and she wa started on Imdur.     2/15/2023 Interpretation Summary  •  Left ventricular systolic function is normal. Calculated left ventricular EF = 56.8%  •  Left ventricular diastolic function is consistent with (grade II w/high LAP) pseudonormalization.  •  The aortic valve exhibits sclerosis.  Mild aortic valve regurgitation is present. No hemodynamically significant aortic valve stenosis is present.  •  There is mild bileaflet mitral valve prolapse present. Mild to moderate mitral valve regurgitation is present. No significant mitral valve stenosis is present.  •  Calculated right ventricular systolic pressure from tricuspid regurgitation is 43 mmHg.     I saw her 3/2/2023, she was accompanied by her daughter.  She states all in all she feels quite well.  She states yesterday she went out in her yard and cleaned out her flower bed and flowerpots and use the weedeater and can do all that without shortness of breath or chest pain.  Occasionally she will feel some palpitations.  She denies any lower extremity edema, dizziness or lightheadedness.  She has not had any syncope or presyncopal episodes.  She denies any fatigue.  She brings a list of her blood pressures that are consistently 120/58-62.  I did recheck her blood pressure while in the office 132/62.  She is taking her medications as directed.  She is having some indigestion and I have asked her to follow-up with you.  She does not take anything on a consistent basis.    She returns today for 3-month follow-up.  She states her blood pressures are not where they were should be.  She brings a list of her home BP meds and she is averaging 150/160-60s.  She at times is as high as 180 systolic.  She is taking medications as directed.  She has had trouble over the years with blood pressure.  When she was in the hospital in  December 2021 her medicines got changed.  She had been on a combination of amlodipine 10/benazepril 40 and that was stopped and then she was started on ramipril 5 at the time.  She was also started on metoprolol.  Over the last year and a half her medicines have been titrated upwards.  BP is still a bit labile.  Her heart rate is steadily in the 50s.  She also states that she gets fatigued and has chest pressure with increased activity, for instance, if she takes her garbage can out to the road.  She states that if she sits down and rest it passes in about 5 minutes.  She is on isosorbide mononitrate 30 mg twice a day.  She cannot tolerate a 60 mg dose at once as she got very nauseated weak and felt like she was going to pass out.  She states the palpitations are actually quite controlled and may be only notices them at night when she is quiet.  The shortness of breath and pressure again happens with increased activity.  She has intermittent ankle edema.  She denies any dizziness, lightheadedness, syncope or presyncopal episodes.  She does not describe the chest pressure as anginal pain but it is accompanied with increased activity.     Past Medical History:   Diagnosis Date   • Aortic valve sclerosis    • Heart murmur    • Hyperlipidemia    • Hypertension    • Malignant tumor of breast    • Palpitations    • PVCs (premature ventricular contractions)        Past Surgical History:   Procedure Laterality Date   • BREAST BIOPSY     • BREAST SURGERY     • THYROID SURGERY         Social History     Socioeconomic History   • Marital status:    Tobacco Use   • Smoking status: Never     Passive exposure: Never   • Smokeless tobacco: Never   • Tobacco comments:     caffeine use   Substance and Sexual Activity   • Alcohol use: Not Currently     Comment: wine twice monthly...social   • Drug use: Never   • Sexual activity: Not Currently     Partners: Male     Birth control/protection: None       Family History   Problem  Relation Age of Onset   • Heart disease Mother    • Hypertension Mother    • Hypertension Father    • Cancer Father    • Heart attack Sister    • Diabetes Son        The following portion of the patient's history were reviewed and updated as appropriate: past medical history, past surgical history, past social history, past family history, allergies, current medications, and problem list.    Review of Systems   Constitutional: Positive for malaise/fatigue. Negative for diaphoresis and fever.   HENT: Negative for congestion, hearing loss, hoarse voice, nosebleeds and sore throat.    Eyes: Negative for photophobia, vision loss in left eye, vision loss in right eye and visual disturbance.   Cardiovascular: Positive for leg swelling (intermittent) and palpitations. Negative for chest pain, dyspnea on exertion, irregular heartbeat, near-syncope, orthopnea, paroxysmal nocturnal dyspnea and syncope.   Respiratory: Positive for shortness of breath (with increased activities). Negative for cough, hemoptysis, sleep disturbances due to breathing, snoring, sputum production and wheezing.    Endocrine: Negative for cold intolerance, heat intolerance, polydipsia, polyphagia and polyuria.   Hematologic/Lymphatic: Negative for bleeding problem. Does not bruise/bleed easily.   Skin: Negative for color change, dry skin, poor wound healing, rash and suspicious lesions.   Musculoskeletal: Negative for arthritis, back pain, falls, gout, joint pain, joint swelling, muscle cramps, muscle weakness and myalgias.   Gastrointestinal: Negative for bloating, abdominal pain, constipation, diarrhea, dysphagia, melena, nausea and vomiting.   Neurological: Negative for excessive daytime sleepiness, dizziness, headaches, light-headedness, loss of balance, numbness, paresthesias, seizures, vertigo and weakness.   Psychiatric/Behavioral: Negative for depression, memory loss and substance abuse. The patient is not nervous/anxious.        Allergies  "  Allergen Reactions   • Sulfa Antibiotics GI Intolerance     NAUSEA         Current Outpatient Medications:   •  dilTIAZem CD (CARDIZEM CD) 300 MG 24 hr capsule, TAKE 1 CAPSULE BY MOUTH EVERY MORNING, Disp: 90 capsule, Rfl: 3  •  escitalopram (LEXAPRO) 10 MG tablet, Take 1 tablet by mouth Daily., Disp: , Rfl:   •  hydroCHLOROthiazide (HYDRODIURIL) 25 MG tablet, Take 1 tablet by mouth Daily. In AM, Disp: , Rfl:   •  isosorbide mononitrate (IMDUR) 30 MG 24 hr tablet, Take 2 tablets by mouth Daily., Disp: 60 tablet, Rfl: 11  •  metoprolol succinate XL (TOPROL-XL) 25 MG 24 hr tablet, metoprolol succinate ER 25 mg tablet,extended release 24 hr  TAKE 1 TABLET BY MOUTH EVERY DAY IN THE EVENING, Disp: , Rfl:   •  multivitamin (THERAGRAN) tablet tablet, Take  by mouth Daily., Disp: , Rfl:   •  ramipril (ALTACE) 10 MG capsule, Take 2 capsules by mouth Every Night., Disp: 180 capsule, Rfl: 3  •  rosuvastatin (CRESTOR) 10 MG tablet, Take 1 tablet by mouth Daily., Disp: , Rfl:         Objective:     Vitals:    05/23/23 1459   BP: 140/70   Pulse: 58   Resp: 16   SpO2: 98%   Weight: 54 kg (119 lb)   Height: 157.5 cm (62\")     Body mass index is 21.77 kg/m².      Vitals reviewed.   Constitutional:       General: Not in acute distress.     Appearance: Well-developed and not in distress.   Eyes:      General:         Right eye: No discharge.         Left eye: No discharge.      Conjunctiva/sclera: Conjunctivae normal.   HENT:      Head: Normocephalic and atraumatic.      Right Ear: External ear normal.      Left Ear: External ear normal.      Nose: Nose normal.   Neck:      Thyroid: No thyromegaly.      Vascular: No JVD.      Trachea: No tracheal deviation.      Lymphadenopathy: No cervical adenopathy.   Pulmonary:      Effort: Pulmonary effort is normal. No respiratory distress.      Breath sounds: Normal breath sounds. No wheezing. No rales.   Chest:      Chest wall: Not tender to palpatation.   Cardiovascular:      Normal rate. " Regular rhythm.      No gallop.   Pulses:     Intact distal pulses.   Edema:     Peripheral edema absent.   Abdominal:      General: There is no distension.      Palpations: Abdomen is soft.      Tenderness: There is no abdominal tenderness.   Musculoskeletal: Normal range of motion.         General: No tenderness or deformity.      Cervical back: Normal range of motion and neck supple. Skin:     General: Skin is warm and dry.      Findings: No erythema or rash.   Neurological:      Mental Status: Alert and oriented to person, place, and time.      Coordination: Coordination normal.   Psychiatric:         Attention and Perception: Attention normal.         Mood and Affect: Mood normal.         Behavior: Behavior normal.         Thought Content: Thought content normal.         Cognition and Memory: Cognition normal.         Judgment: Judgment normal.               ECG 12 Lead    Date/Time: 5/23/2023 3:10 PM  Performed by: Earline Juan APRN  Authorized by: Earline Juan APRN   Comparison: compared with previous ECG from 3/2/2023  Similar to previous ECG  Rhythm: sinus rhythm  Rate: normal  Conduction: conduction normal  ST Segments: ST segments normal  T flattening: aVF  QRS axis: normal    Clinical impression: normal ECG              Assessment:       Diagnosis Plan   1. Aortic valve sclerosis        2. Heart murmur        3. PVCs (premature ventricular contractions)        4. Primary hypertension        5. Mixed hyperlipidemia               Plan:       1. Hypertension, goal less than 120/80.  And adequate control.  She is average 150s-160s/60 at home.  She states compliance with her medications.  2. Hyperlipidemia -continue lipid-lowering therapy, currently on rosuvastatin 10 mg daily.  3. PVCs -well-controlled, rarely feels them except may be if she is quiet at night.  We will continue on metoprolol and diltiazem.  4. H/o breast cancer with radiation.   5. Back pain which increases her blood pressure and causes  her to exercise less.  6.  Precordial pain/abnormal stress test-  echo and stress test as above.  The stress test did show area of infarct but no ischemia.  She is on Imdur 30 mg twice daily.  She could not tolerate it and 1 dose in the morning.  She states she still has shortness of breath and chest pressure with increased activity, for instance, taking her garbage can out to the road.  This does pass with about 5 minutes of rest.    She is still having some chest discomfort and her blood pressure is not well controlled.  We will send a note to Dr. Serna for recommendations.  I do not have room on the beta-blocker to increase for angina, also she could not tolerate Imdur at 60 mg doses.  She had previously been on amlodipine but that was switched to diltiazem for control of palpitations.    Keep appointment with Dr. Burton for September.    As always, it has been a pleasure to participate in your patient's care. Thank you.       Sincerely,       SALINAS Silveira      Current Outpatient Medications:   •  dilTIAZem CD (CARDIZEM CD) 300 MG 24 hr capsule, TAKE 1 CAPSULE BY MOUTH EVERY MORNING, Disp: 90 capsule, Rfl: 3  •  escitalopram (LEXAPRO) 10 MG tablet, Take 1 tablet by mouth Daily., Disp: , Rfl:   •  hydroCHLOROthiazide (HYDRODIURIL) 25 MG tablet, Take 1 tablet by mouth Daily. In AM, Disp: , Rfl:   •  isosorbide mononitrate (IMDUR) 30 MG 24 hr tablet, Take 2 tablets by mouth Daily., Disp: 60 tablet, Rfl: 11  •  metoprolol succinate XL (TOPROL-XL) 25 MG 24 hr tablet, metoprolol succinate ER 25 mg tablet,extended release 24 hr  TAKE 1 TABLET BY MOUTH EVERY DAY IN THE EVENING, Disp: , Rfl:   •  multivitamin (THERAGRAN) tablet tablet, Take  by mouth Daily., Disp: , Rfl:   •  ramipril (ALTACE) 10 MG capsule, Take 2 capsules by mouth Every Night., Disp: 180 capsule, Rfl: 3  •  rosuvastatin (CRESTOR) 10 MG tablet, Take 1 tablet by mouth Daily., Disp: , Rfl:       Dictated utilizing Dragon dictation

## 2023-05-23 NOTE — LETTER
May 23, 2023       No Recipients    Patient: Jia Gillis   YOB: 1935   Date of Visit: 2023       Dear Dr. Rudolph Recipients:    Thank you for referring Jia Gillis to me for evaluation. Below are the relevant portions of my assessment and plan of care.    If you have questions, please do not hesitate to call me. I look forward to following iJa along with you.         Sincerely,        SALINAS Recinos        CC:   No Recipients    Earline Juan APRN  23 [id number]  Signed    Date of Office Visit: 2023  Encounter Provider: SALINAS Silveira  Place of Service: [facility] CARDIOLOGY  Patient Name: Jia Gillis  :1935  Primary Cardiologist: Dr. Burton    CC:  3 month follow up    Dear Dr. Ramos    HPI: Jia Gillis is a pleasant 87 y.o. female who presents 2023 for cardiac follow up.  I reviewed her past medical records including notes, labs and testing in preparation for today's visit.    She has a history of hypertension, hyperlipidemia, thyroid disease, history of breast cancer with radiation therapy.  Her breast cancer was first diagnosed in the  on the right side and she had a lumpectomy.  She had a recurrence in  on the right side and had a lumpectomy with radiation therapy and tamoxifen.  She then had a recurrence of breast cancer on the left side in  with a left-sided lumpectomy and no adjuvant therapy.     She is , has 3 children and is retired.  She uses no cigarettes and has social alcohol.  She has 4 cups of coffee per day.  Her sister had a myocardial infarction at the age of 40.  Her mother heart disease and hypertension.  Her son has diabetes.     In 2021, she presented to the [facility] emergency department for evaluation of lightheadedness and near syncope.  She reported that 6 months prior she was experiencing more palpitations her PCP started her on metoprolol succinate 25 mg daily.  She felt worse  after starting the medications and felt like her palpitations were stronger and more prominent.  Her metoprolol was decreased to 12.5 mg twice per day and her palpitations did not improve.  She also noticed when she had a glass of wine that this would increase her palpitations.       Dr. Armaan Singleton consulted on her during the hospitalization.  Her thyroid and electrolytes were normal.  Myocardial perfusion study showed no evidence of ischemia and unifocal PVCs stayed the same during the entire test.  Echocardiogram showed normal LVEF, no LVH or diastolic dysfunction, aortic valve sclerosis, and mild mitral annular calcification.  She was discharged on a 14-day Holter monitor which showed normal sinus rhythm, average heart rate 59, and a total PVC burden of 11.1%.  She had 3 episodes of nonsustained SVT the longest 11 beats in duration and the fastest at a rate of 158.  Her metoprolol was discontinued and she was switched to diltiazem.     She saw Dr. Burton 11/2022.  She has no chest pain.  She has higher blood pressure at home running 140-150 but it was better over the summer when she was exercising doing exercises in the pool.  Currently she is not exercising her blood pressure is gone up.  Of the summer is running more 130-140.  She does not feel her heart racing or skipping.  She has had no dizziness or falls.  She does feel her self getting more weak because she is not exercising.  She has a lot of back pain and the back pain causes her blood pressure to go up and then also causes her not to want to exercise.     You had ordered a stress test with the following results:  1/25/2023 - Interpretation Summary   •  (Calculated EF = 55%).  There is hypokinesis of the inferior wall.  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     I saw her 1/30/2023 to discuss the abnormal stress test.  She states in the last 3 months she is  had episodic shortness of breath and chest pressure.  It usually happens after strenuous activity such as dragging both of her garbage cans of her long driveway at the same time or if she has been out raking leaves.  She states it is a pressure versus pain.  She is also noticed increased fatigue.  She states the pressure happens maybe twice a week.  She denies any dizziness, lightheadedness, syncope or presyncopal episodes.  She denies any lower extremity edema, or she has not had any palpitations.  She is taking her medications as directed.  Her blood pressure is borderline systolic elevated today.  An echo was ordered and she wa started on Imdur.     2/15/2023 Interpretation Summary  •  Left ventricular systolic function is normal. Calculated left ventricular EF = 56.8%  •  Left ventricular diastolic function is consistent with (grade II w/high LAP) pseudonormalization.  •  The aortic valve exhibits sclerosis.  Mild aortic valve regurgitation is present. No hemodynamically significant aortic valve stenosis is present.  •  There is mild bileaflet mitral valve prolapse present. Mild to moderate mitral valve regurgitation is present. No significant mitral valve stenosis is present.  •  Calculated right ventricular systolic pressure from tricuspid regurgitation is 43 mmHg.     I saw her 3/2/2023, she was accompanied by her daughter.  She states all in all she feels quite well.  She states yesterday she went out in her yard and cleaned out her flower bed and flowerpots and use the weedeater and can do all that without shortness of breath or chest pain.  Occasionally she will feel some palpitations.  She denies any lower extremity edema, dizziness or lightheadedness.  She has not had any syncope or presyncopal episodes.  She denies any fatigue.  She brings a list of her blood pressures that are consistently 120/58-62.  I did recheck her blood pressure while in the office 132/62.  She is taking her medications as  directed.  She is having some indigestion and I have asked her to follow-up with you.  She does not take anything on a consistent basis.    She returns today for 3-month follow-up.  She states her blood pressures are not where they were should be.  She brings a list of her home BP meds and she is averaging 150/160-60s.  She at times is as high as 180 systolic.  She is taking medications as directed.  She has had trouble over the years with blood pressure.  When she was in the hospital in December 2021 her medicines got changed.  She had been on a combination of amlodipine 10/benazepril 40 and that was stopped and then she was started on ramipril 5 at the time.  She was also started on metoprolol.  Over the last year and a half her medicines have been titrated upwards.  BP is still a bit labile.  Her heart rate is steadily in the 50s.  She also states that she gets fatigued and has chest pressure with increased activity, for instance, if she takes her garbage can out to the road.  She states that if she sits down and rest it passes in about 5 minutes.  She is on isosorbide mononitrate 30 mg twice a day.  She cannot tolerate a 60 mg dose at once as she got very nauseated weak and felt like she was going to pass out.  She states the palpitations are actually quite controlled and may be only notices them at night when she is quiet.  The shortness of breath and pressure again happens with increased activity.  She has intermittent ankle edema.  She denies any dizziness, lightheadedness, syncope or presyncopal episodes.  She does not describe the chest pressure as anginal pain but it is accompanied with increased activity.     Past Medical History:   Diagnosis Date   • Aortic valve sclerosis    • Heart murmur    • Hyperlipidemia    • Hypertension    • Malignant tumor of breast    • Palpitations    • PVCs (premature ventricular contractions)        Past Surgical History:   Procedure Laterality Date   • BREAST BIOPSY     •  BREAST SURGERY     • THYROID SURGERY         Social History     Socioeconomic History   • Marital status:    Tobacco Use   • Smoking status: Never     Passive exposure: Never   • Smokeless tobacco: Never   • Tobacco comments:     caffeine use   Substance and Sexual Activity   • Alcohol use: Not Currently     Comment: wine twice monthly...social   • Drug use: Never   • Sexual activity: Not Currently     Partners: Male     Birth control/protection: None       Family History   Problem Relation Age of Onset   • Heart disease Mother    • Hypertension Mother    • Hypertension Father    • Cancer Father    • Heart attack Sister    • Diabetes Son        The following portion of the patient's history were reviewed and updated as appropriate: past medical history, past surgical history, past social history, past family history, allergies, current medications, and problem list.    Review of Systems   Constitutional: Positive for malaise/fatigue. Negative for diaphoresis and fever.   HENT: Negative for congestion, hearing loss, hoarse voice, nosebleeds and sore throat.    Eyes: Negative for photophobia, vision loss in left eye, vision loss in right eye and visual disturbance.   Cardiovascular: Positive for leg swelling (intermittent) and palpitations. Negative for chest pain, dyspnea on exertion, irregular heartbeat, near-syncope, orthopnea, paroxysmal nocturnal dyspnea and syncope.   Respiratory: Positive for shortness of breath (with increased activities). Negative for cough, hemoptysis, sleep disturbances due to breathing, snoring, sputum production and wheezing.    Endocrine: Negative for cold intolerance, heat intolerance, polydipsia, polyphagia and polyuria.   Hematologic/Lymphatic: Negative for bleeding problem. Does not bruise/bleed easily.   Skin: Negative for color change, dry skin, poor wound healing, rash and suspicious lesions.   Musculoskeletal: Negative for arthritis, back pain, falls, gout, joint pain,  "joint swelling, muscle cramps, muscle weakness and myalgias.   Gastrointestinal: Negative for bloating, abdominal pain, constipation, diarrhea, dysphagia, melena, nausea and vomiting.   Neurological: Negative for excessive daytime sleepiness, dizziness, headaches, light-headedness, loss of balance, numbness, paresthesias, seizures, vertigo and weakness.   Psychiatric/Behavioral: Negative for depression, memory loss and substance abuse. The patient is not nervous/anxious.        Allergies   Allergen Reactions   • Sulfa Antibiotics GI Intolerance     NAUSEA         Current Outpatient Medications:   •  dilTIAZem CD (CARDIZEM CD) 300 MG 24 hr capsule, TAKE 1 CAPSULE BY MOUTH EVERY MORNING, Disp: 90 capsule, Rfl: 3  •  escitalopram (LEXAPRO) 10 MG tablet, Take 1 tablet by mouth Daily., Disp: , Rfl:   •  hydroCHLOROthiazide (HYDRODIURIL) 25 MG tablet, Take 1 tablet by mouth Daily. In AM, Disp: , Rfl:   •  isosorbide mononitrate (IMDUR) 30 MG 24 hr tablet, Take 2 tablets by mouth Daily., Disp: 60 tablet, Rfl: 11  •  metoprolol succinate XL (TOPROL-XL) 25 MG 24 hr tablet, metoprolol succinate ER 25 mg tablet,extended release 24 hr  TAKE 1 TABLET BY MOUTH EVERY DAY IN THE EVENING, Disp: , Rfl:   •  multivitamin (THERAGRAN) tablet tablet, Take  by mouth Daily., Disp: , Rfl:   •  ramipril (ALTACE) 10 MG capsule, Take 2 capsules by mouth Every Night., Disp: 180 capsule, Rfl: 3  •  rosuvastatin (CRESTOR) 10 MG tablet, Take 1 tablet by mouth Daily., Disp: , Rfl:        Objective:     Vitals:    05/23/23 1459   BP: 140/70   Pulse: 58   Resp: 16   SpO2: 98%   Weight: 54 kg (119 lb)   Height: 157.5 cm (62\")     Body mass index is 21.77 kg/m².      Vitals reviewed.   Constitutional:       General: Not in acute distress.     Appearance: Well-developed and not in distress.   Eyes:      General:         Right eye: No discharge.         Left eye: No discharge.      Conjunctiva/sclera: Conjunctivae normal.   HENT:      Head: Normocephalic " and atraumatic.      Right Ear: External ear normal.      Left Ear: External ear normal.      Nose: Nose normal.   Neck:      Thyroid: No thyromegaly.      Vascular: No JVD.      Trachea: No tracheal deviation.      Lymphadenopathy: No cervical adenopathy.   Pulmonary:      Effort: Pulmonary effort is normal. No respiratory distress.      Breath sounds: Normal breath sounds. No wheezing. No rales.   Chest:      Chest wall: Not tender to palpatation.   Cardiovascular:      Normal rate. Regular rhythm.      No gallop.   Pulses:     Intact distal pulses.   Edema:     Peripheral edema absent.   Abdominal:      General: There is no distension.      Palpations: Abdomen is soft.      Tenderness: There is no abdominal tenderness.   Musculoskeletal: Normal range of motion.         General: No tenderness or deformity.      Cervical back: Normal range of motion and neck supple. Skin:     General: Skin is warm and dry.      Findings: No erythema or rash.   Neurological:      Mental Status: Alert and oriented to person, place, and time.      Coordination: Coordination normal.   Psychiatric:         Attention and Perception: Attention normal.         Mood and Affect: Mood normal.         Behavior: Behavior normal.         Thought Content: Thought content normal.         Cognition and Memory: Cognition normal.         Judgment: Judgment normal.               ECG 12 Lead    Date/Time: 5/23/2023 3:10 PM  Performed by: Earline Juan APRN  Authorized by: Earline Juan APRN   Comparison: compared with previous ECG from 3/2/2023  Similar to previous ECG  Rhythm: sinus rhythm  Rate: normal  Conduction: conduction normal  ST Segments: ST segments normal  T flattening: aVF  QRS axis: normal    Clinical impression: normal ECG              Assessment:       Diagnosis Plan   1. Aortic valve sclerosis        2. Heart murmur        3. PVCs (premature ventricular contractions)        4. Primary hypertension        5. Mixed hyperlipidemia                Plan:       1. Hypertension, goal less than 120/80.  And adequate control.  She is average 150s-160s/60 at home.  She states compliance with her medications.  2. Hyperlipidemia -continue lipid-lowering therapy, currently on rosuvastatin 10 mg daily.  3. PVCs -well-controlled, rarely feels them except may be if she is quiet at night.  We will continue on metoprolol and diltiazem.  4. H/o breast cancer with radiation.   5. Back pain which increases her blood pressure and causes her to exercise less.  6.  Precordial pain/abnormal stress test-  echo and stress test as above.  The stress test did show area of infarct but no ischemia.  She is on Imdur 30 mg twice daily.  She could not tolerate it and 1 dose in the morning.  She states she still has shortness of breath and chest pressure with increased activity, for instance, taking her garbage can out to the road.  This does pass with about 5 minutes of rest.    She is still having some chest discomfort and her blood pressure is not well controlled.  We will send a note to Dr. Serna for recommendations.  I do not have room on the beta-blocker to increase for angina, also she could not tolerate Imdur at 60 mg doses.  She had previously been on amlodipine but that was switched to diltiazem for control of palpitations.    Keep appointment with Dr. Burton for September.    As always, it has been a pleasure to participate in your patient's care. Thank you.       Sincerely,       SALINAS Silveira      Current Outpatient Medications:   •  dilTIAZem CD (CARDIZEM CD) 300 MG 24 hr capsule, TAKE 1 CAPSULE BY MOUTH EVERY MORNING, Disp: 90 capsule, Rfl: 3  •  escitalopram (LEXAPRO) 10 MG tablet, Take 1 tablet by mouth Daily., Disp: , Rfl:   •  hydroCHLOROthiazide (HYDRODIURIL) 25 MG tablet, Take 1 tablet by mouth Daily. In AM, Disp: , Rfl:   •  isosorbide mononitrate (IMDUR) 30 MG 24 hr tablet, Take 2 tablets by mouth Daily., Disp: 60 tablet, Rfl: 11  •  metoprolol  succinate XL (TOPROL-XL) 25 MG 24 hr tablet, metoprolol succinate ER 25 mg tablet,extended release 24 hr  TAKE 1 TABLET BY MOUTH EVERY DAY IN THE EVENING, Disp: , Rfl:   •  multivitamin (THERAGRAN) tablet tablet, Take  by mouth Daily., Disp: , Rfl:   •  ramipril (ALTACE) 10 MG capsule, Take 2 capsules by mouth Every Night., Disp: 180 capsule, Rfl: 3  •  rosuvastatin (CRESTOR) 10 MG tablet, Take 1 tablet by mouth Daily., Disp: , Rfl:       Dictated utilizing Dragon dictation

## 2023-05-24 RX ORDER — AMLODIPINE BESYLATE 5 MG/1
5 TABLET ORAL DAILY
Qty: 90 TABLET | Refills: 3 | Status: SHIPPED | OUTPATIENT
Start: 2023-05-24

## 2023-08-31 ENCOUNTER — TRANSCRIBE ORDERS (OUTPATIENT)
Dept: CT IMAGING | Facility: HOSPITAL | Age: 88
End: 2023-08-31
Payer: MEDICARE

## 2023-08-31 DIAGNOSIS — R22.1 LOCALIZED SWELLING, MASS AND LUMP, NECK: Primary | ICD-10-CM

## 2023-09-08 ENCOUNTER — OFFICE VISIT (OUTPATIENT)
Dept: CARDIOLOGY | Facility: CLINIC | Age: 88
End: 2023-09-08
Payer: MEDICARE

## 2023-09-08 VITALS
HEIGHT: 62 IN | DIASTOLIC BLOOD PRESSURE: 60 MMHG | WEIGHT: 118.4 LBS | BODY MASS INDEX: 21.79 KG/M2 | HEART RATE: 53 BPM | SYSTOLIC BLOOD PRESSURE: 146 MMHG

## 2023-09-08 DIAGNOSIS — I49.3 PVCS (PREMATURE VENTRICULAR CONTRACTIONS): ICD-10-CM

## 2023-09-08 DIAGNOSIS — E78.2 MIXED HYPERLIPIDEMIA: ICD-10-CM

## 2023-09-08 DIAGNOSIS — N18.32 STAGE 3B CHRONIC KIDNEY DISEASE: ICD-10-CM

## 2023-09-08 DIAGNOSIS — I10 PRIMARY HYPERTENSION: Primary | ICD-10-CM

## 2023-09-08 NOTE — PROGRESS NOTES
Date of Office Visit: 2023  Encounter Provider: Belkys Burton MD  Place of Service: [facility] CARDIOLOGY  Patient Name: Jia Gillis  :1935      Patient ID:  Jia Gillis is a 88 y.o. female is here for  followup for PVCs, hypertension.        History of Present Illness    She has a history of hypertension, hyperlipidemia, thyroid disease, history of breast cancer with radiation therapy.  Her breast cancer was first diagnosed in the  on the right side and she had a lumpectomy.  She had a recurrence in  on the right side and had a lumpectomy with radiation therapy and tamoxifen.  She then had a recurrence of breast cancer on the left side in  with a left-sided lumpectomy and no adjuvant therapy.  She is here for follow-up for PVCs.     She is , has 3 children and is retired.  She uses no cigarettes and has social alcohol.  She has 4 cups of coffee per day.  Her sister had a myocardial infarction at the age of 40.  Her mother heart disease and hypertension.  Her son has diabetes.     In 2021, she presented to the [facility] emergency department for evaluation of lightheadedness and near syncope.  She reported that 6 months prior she was experiencing more palpitations-her PCP started her on metoprolol succinate 25 mg daily.  She felt worse after starting the medications and felt like her palpitations were stronger and more prominent.  Her metoprolol was decreased to 12.5 mg twice per day and her palpitations did not improve.  She also noticed when she had a glass of wine that this would increase her palpitations.       Dr. Singleton consulted on her during the hospitalization.  Her thyroid and electrolytes were normal.  Myocardial perfusion study showed no evidence of ischemia and unifocal PVCs stayed the same during the entire test.  Echocardiogram showed normal LVEF, no LVH or diastolic dysfunction, aortic valve sclerosis, and mild mitral  annular calcification.  She was discharged on a 14-day Holter monitor which showed normal sinus rhythm, average heart rate 59, and a total PVC burden of 11.1%.  She had 3 episodes of nonsustained SVT the longest 11 beats in duration and the fastest at a rate of 158.  Her metoprolol was discontinued and she was switched to diltiazem.    She had a stress nuclear perfusion study in 1/25/2023 which showed inferior wall infarct without ischemia.  Echocardiogram done 2/15/2023 showed ejection fraction of 57% with grade 2 diastolic dysfunction, mild to moderate mitral insufficiency, mild aortic insufficiency, aortic valve sclerosis, RVSP 43 mmHg.    She has had exertional chest tightness and pressure and has been taking isosorbide 30 mg twice daily for this.  She has been doing water aerobics and does not feel her heart racing or skipping.  She has had no dizziness, syncope or falls.  She does not have chest pain or difficulty breathing with water aerobics.  She is taking her medications as directed without difficulty.  She is starting to lose her hearing.  She has no orthopnea or PND.    Addendum: Labs done 8/28/2023 show normal CMP, normal CBC, total cholesterol 175, HDL 64, triglycerides 84, LDL 94, non-, normal CBC, normal TSH.    Past Medical History:   Diagnosis Date    Aortic valve sclerosis     Heart murmur     Hyperlipidemia     Hypertension     Malignant tumor of breast     Palpitations     PVCs (premature ventricular contractions)          Past Surgical History:   Procedure Laterality Date    BREAST BIOPSY      BREAST SURGERY      THYROID SURGERY         Current Outpatient Medications on File Prior to Visit   Medication Sig Dispense Refill    amLODIPine (NORVASC) 5 MG tablet Take 1 tablet by mouth Daily. 90 tablet 3    escitalopram (LEXAPRO) 10 MG tablet Take 1 tablet by mouth Daily.      hydroCHLOROthiazide (HYDRODIURIL) 25 MG tablet Take 1 tablet by mouth Daily. In AM      isosorbide mononitrate (IMDUR)  "30 MG 24 hr tablet Take 2 tablets by mouth Daily. 60 tablet 11    metoprolol succinate XL (TOPROL-XL) 25 MG 24 hr tablet metoprolol succinate ER 25 mg tablet,extended release 24 hr   TAKE 1 TABLET BY MOUTH EVERY DAY IN THE EVENING      multivitamin (THERAGRAN) tablet tablet Take  by mouth Daily.      ramipril (ALTACE) 10 MG capsule Take 2 capsules by mouth Every Night. 180 capsule 3    rosuvastatin (CRESTOR) 10 MG tablet Take 1 tablet by mouth Daily.       No current facility-administered medications on file prior to visit.       Social History     Socioeconomic History    Marital status:    Tobacco Use    Smoking status: Never     Passive exposure: Never    Smokeless tobacco: Never    Tobacco comments:     caffeine use   Vaping Use    Vaping Use: Never used   Substance and Sexual Activity    Alcohol use: Not Currently     Comment: Occ    Drug use: Never    Sexual activity: Not Currently     Partners: Male     Birth control/protection: None           ROS    Procedures    ECG 12 Lead    Date/Time: 9/8/2023 11:31 AM  Performed by: Belkys Burton MD  Authorized by: Belkys Burton MD   Comparison: compared with previous ECG   Similar to previous ECG  Rhythm: sinus rhythm    Clinical impression: normal ECG            Objective:      Vitals:    09/08/23 1107 09/08/23 1109   BP: 144/60 146/60   BP Location: Left arm Right arm   Pulse: 53    Weight: 53.7 kg (118 lb 6.4 oz)    Height: 157.5 cm (62\")      Body mass index is 21.66 kg/m².    Vitals reviewed.   Constitutional:       General: Not in acute distress.     Appearance: Well-developed. Not diaphoretic.   Eyes:      General: No scleral icterus.     Conjunctiva/sclera: Conjunctivae normal.   HENT:      Head: Normocephalic and atraumatic.   Neck:      Thyroid: No thyromegaly.      Vascular: No carotid bruit or JVD.      Lymphadenopathy: No cervical adenopathy.   Pulmonary:      Effort: Pulmonary effort is normal. No respiratory distress.      Breath " sounds: Normal breath sounds. No wheezing. No rhonchi. No rales.   Chest:      Chest wall: Not tender to palpatation.   Cardiovascular:      Normal rate. Regular rhythm.      Murmurs: There is no murmur.      No gallop.  No rub.   Pulses:     Intact distal pulses.      Carotid: 2+ bilaterally.     Radial: 2+ bilaterally.     Dorsalis pedis: 2+ bilaterally.     Posterior tibial: 2+ bilaterally.  Edema:     Peripheral edema absent.   Abdominal:      General: Bowel sounds are normal. There is no distension or abdominal bruit.      Palpations: Abdomen is soft. There is no abdominal mass.      Tenderness: There is no abdominal tenderness.   Musculoskeletal:         General: No deformity.      Extremities: No clubbing present.     Cervical back: Neck supple. Skin:     General: Skin is warm and dry. There is no cyanosis.      Coloration: Skin is not pale.      Findings: No rash.   Neurological:      Mental Status: Alert and oriented to person, place, and time.      Cranial Nerves: No cranial nerve deficit.   Psychiatric:         Judgment: Judgment normal.       Lab Review:       Assessment:      Diagnosis Plan   1. Primary hypertension        2. Mixed hyperlipidemia        3. PVCs (premature ventricular contractions)        4. Stage 3b chronic kidney disease          Hypertension, goal l< 120/80.    Her blood pressure checks at home are mid 130s systolic.  Maintain her amlodipine, hydrochlorothiazide, metoprolol, Altace.  Hyperlipidemia, on rosuvastatin.  We will get labs from PCP.  PVCs, on Toprol all for this.  H/o breast cancer with radiation.   Back pain which increases her blood pressure and causes her to exercise less.  Anginal chest pain, well treated with amlodipine, metoprolol and isosorbide.        Plan:       See [place] in 1 year, no medication changes.  We will get her laboratory values from Dr. Ramos's office.  Her PVCs are under better control on metoprolol.  Her blood pressure has also been under fair  control.  Advised continued exercise through the winter.

## 2023-09-11 ENCOUNTER — HOSPITAL ENCOUNTER (OUTPATIENT)
Dept: CT IMAGING | Facility: HOSPITAL | Age: 88
Discharge: HOME | End: 2023-09-11
Admitting: INTERNAL MEDICINE
Payer: MEDICARE

## 2023-09-11 DIAGNOSIS — R22.1 LOCALIZED SWELLING, MASS AND LUMP, NECK: ICD-10-CM

## 2023-09-11 LAB — CREAT BLDA-MCNC: 1 MG/DL (ref 0.6–1.3)

## 2023-09-11 PROCEDURE — 70491 CT SOFT TISSUE NECK W/DYE: CPT

## 2023-09-11 PROCEDURE — 25510000001 IOPAMIDOL PER 1 ML: Performed by: INTERNAL MEDICINE

## 2023-09-11 PROCEDURE — 82565 ASSAY OF CREATININE: CPT

## 2023-09-11 RX ADMIN — IOPAMIDOL 100 ML: 755 INJECTION, SOLUTION INTRAVENOUS at 16:07

## 2024-03-14 RX ORDER — RAMIPRIL 10 MG/1
20 CAPSULE ORAL NIGHTLY
Qty: 180 CAPSULE | Refills: 3 | Status: SHIPPED | OUTPATIENT
Start: 2024-03-14

## 2024-03-14 NOTE — TELEPHONE ENCOUNTER
Failed protocol    NOV-09/13/24-MM  LOV-09/08/23-RM      Plan:       See [place] in 1 year, no medication changes.  We will get her laboratory values from Dr. Ramos's office.  Her PVCs are under better control on metoprolol.  Her blood pressure has also been under fair control.  Advised continued exercise through the winter.

## 2024-03-14 NOTE — TELEPHONE ENCOUNTER
Caller: Jia Gillis    Relationship: Self    Best call back number: [phone]    Requested Prescriptions:   Requested Prescriptions     Pending Prescriptions Disp Refills    ramipril (ALTACE) 10 MG capsule 180 capsule 3     Sig: Take 2 capsules by mouth Every Night.        Pharmacy where request should be sent: [id number] [address] [phone]  - [phone] FX     Last office visit with prescribing clinician: 9/8/2023   Last telemedicine visit with prescribing clinician: Visit date not found   Next office visit with prescribing clinician: Visit date not found     Additional details provided by patient: PT WOULD LIKE 90 DAY SUPPLY    Does the patient have less than a 3 day supply:  [] Yes  [x] No    Would you like a call back once the refill request has been completed: [] Yes [x] No    If the office needs to give you a call back, can they leave a voicemail: [x] Yes [] No    Elke Goldman Rep   03/14/24 10:14 EDT

## 2024-03-22 RX ORDER — ISOSORBIDE MONONITRATE 30 MG/1
60 TABLET, EXTENDED RELEASE ORAL DAILY
Qty: 60 TABLET | Refills: 11 | Status: SHIPPED | OUTPATIENT
Start: 2024-03-22

## 2024-09-13 ENCOUNTER — OFFICE VISIT (OUTPATIENT)
Dept: CARDIOLOGY | Facility: CLINIC | Age: 89
End: 2024-09-13
Payer: MEDICARE

## 2024-09-13 VITALS
SYSTOLIC BLOOD PRESSURE: 150 MMHG | HEIGHT: 62 IN | BODY MASS INDEX: 22.03 KG/M2 | OXYGEN SATURATION: 99 % | HEART RATE: 50 BPM | WEIGHT: 119.7 LBS | DIASTOLIC BLOOD PRESSURE: 70 MMHG

## 2024-09-13 DIAGNOSIS — I10 PRIMARY HYPERTENSION: Primary | ICD-10-CM

## 2024-09-13 DIAGNOSIS — E78.2 MIXED HYPERLIPIDEMIA: ICD-10-CM

## 2024-09-13 DIAGNOSIS — I49.3 PVCS (PREMATURE VENTRICULAR CONTRACTIONS): ICD-10-CM

## 2024-09-13 DIAGNOSIS — R42 DIZZINESS: ICD-10-CM

## 2024-09-13 PROBLEM — R00.1 BRADYCARDIA, SINUS: Status: ACTIVE | Noted: 2024-09-13

## 2024-09-13 RX ORDER — AMLODIPINE BESYLATE 10 MG/1
1 TABLET ORAL DAILY
COMMUNITY

## 2024-09-13 RX ORDER — SPIRONOLACTONE 25 MG/1
12.5 TABLET ORAL DAILY
Qty: 45 TABLET | Refills: 3 | Status: SHIPPED | OUTPATIENT
Start: 2024-09-13

## 2024-09-13 RX ORDER — MONTELUKAST SODIUM 10 MG/1
10 TABLET ORAL NIGHTLY
COMMUNITY

## 2024-09-13 NOTE — PROGRESS NOTES
Date of Office Visit: 2024  Encounter Provider: SALINAS Brewer  Place of Service: [facility]  Established cardiologist: Belkys Burton MD  Patient Name: Jia Gillis  :1935      Patient ID:  Jia Gillis is a 89 y.o. female is here for  followup    With a pertinent medical history of hypertension, hyperlipidemia, thyroid disease, history of breast cancer with radiation therapy   She is , has 3 children and is retired.  She uses no cigarettes and has social alcohol.  She has 4 cups of coffee per day.  Her sister had a myocardial infarction at the age of 40.  Her mother heart disease and hypertension.  Her son has diabetes.     History of Present Illness  In 2021, she presented to the [facility] emergency department for evaluation of lightheadedness and near syncope.  She reported that 6 months prior she was experiencing more palpitations-her PCP started her on metoprolol succinate 25 mg daily.  She felt worse after starting the medications and felt like her palpitations were stronger and more prominent.  Her metoprolol was decreased to 12.5 mg twice per day and her palpitations did not improve.  She also noticed when she had a glass of wine that this would increase her palpitations.       Dr. Singleton consulted on her during the hospitalization.  Her thyroid and electrolytes were normal.  Myocardial perfusion study showed no evidence of ischemia and unifocal PVCs stayed the same during the entire test.  Echocardiogram showed normal LVEF, no LVH or diastolic dysfunction, aortic valve sclerosis, and mild mitral annular calcification.  She was discharged on a 14-day Holter monitor which showed normal sinus rhythm, average heart rate 59, and a total PVC burden of 11.1%.  She had 3 episodes of nonsustained SVT the longest 11 beats in duration and the fastest at a rate of 158.  Her metoprolol was discontinued and she was switched to  diltiazem.     She had a stress nuclear perfusion study in 1/25/2023 which showed inferior wall infarct without ischemia.     Echocardiogram done 2/15/2023 showed ejection fraction of 57% with grade 2 diastolic dysfunction, mild to moderate mitral insufficiency, mild aortic insufficiency, aortic valve sclerosis, RVSP 43 mmHg.     Today Jia is feeling very well.  She has been doing water aerobics about 3 times a week she will exercise for 30 to 45 minutes she has no shortness of breath or chest tightness during this.  She has had improved appetite since working out routinely.  The only thing new to her is that she has noticed when she has been sitting for a while at the computer and stands up there is dizziness.  There is no chest pain or pressure, soa, robledo, pnd, presyncope/syncope, leg swelling, heart racing, or palpitations.     Current Outpatient Medications on File Prior to Visit   Medication Sig Dispense Refill    FLUoxetine (PROzac) 20 MG capsule Take 1 capsule by mouth Daily.      hydroCHLOROthiazide (HYDRODIURIL) 25 MG tablet Take 1 tablet by mouth Daily. In AM      isosorbide mononitrate (IMDUR) 30 MG 24 hr tablet TAKE 2 TABLETS BY MOUTH DAILY 60 tablet 11    metoprolol succinate XL (TOPROL-XL) 25 MG 24 hr tablet metoprolol succinate ER 25 mg tablet,extended release 24 hr   TAKE 1 TABLET BY MOUTH EVERY DAY IN THE EVENING      montelukast (SINGULAIR) 10 MG tablet Take 1 tablet by mouth Every Night.      multivitamin (THERAGRAN) tablet tablet Take  by mouth Daily.      ramipril (ALTACE) 10 MG capsule Take 2 capsules by mouth Every Night. 180 capsule 3    rosuvastatin (CRESTOR) 10 MG tablet Take 1 tablet by mouth Daily.      [DISCONTINUED] amLODIPine (NORVASC) 5 MG tablet Take 1 tablet by mouth Daily. 90 tablet 3    amLODIPine (NORVASC) 10 MG tablet Take 1 tablet by mouth Daily.      [DISCONTINUED] escitalopram (LEXAPRO) 10 MG tablet Take 1 tablet by mouth Daily. (Patient not taking: Reported on 9/13/2024)    "    No current facility-administered medications on file prior to visit.         Procedures    ECG 12 Lead    Date/Time: 9/13/2024 1:32 PM  Performed by: Julianne Horton APRN    Authorized by: Julianne Horton APRN  Comparison: compared with previous ECG from 9/8/2023  Rhythm: sinus bradycardia  BPM: 45            Objective:      Vitals:    09/13/24 1304   BP: 150/70   Pulse: 50   SpO2: 97%   Weight: 54.3 kg (119 lb 11.2 oz)   Height: 157.5 cm (62\")     Body mass index is 21.89 kg/m².  Wt Readings from Last 3 Encounters:   09/13/24 54.3 kg (119 lb 11.2 oz)   09/08/23 53.7 kg (118 lb 6.4 oz)   05/23/23 54 kg (119 lb)     Constitutional:       Comments: Jia is an 89-year-old  female who is slender, well-appearing, and in no acute distress.    Pulmonary:      Effort: Pulmonary effort is normal.      Breath sounds: Normal breath sounds.   Cardiovascular:      Bradycardia present. Regular rhythm.      Murmurs: There is a grade 1/6 systolic murmur.   Pulses:     Radial: 2+ bilaterally.     Dorsalis pedis: 2+ bilaterally.     Posterior tibial: 2+ bilaterally.  Edema:     Peripheral edema absent.   Skin:     General: Skin is warm and dry.   Neurological:      Mental Status: Alert and oriented to person, place and time.     Lab Review:      Lab Results   Component Value Date    TSH 2.490 12/26/2021       No results found for: \"CHOL\", \"CHLPL\"  No results found for: \"TRIG\"  No results found for: \"HDL\"  No results found for: \"LDL\", \"LDLDIRECT\"    Lab Results   Component Value Date    WBC 5.75 12/27/2021    HGB 10.6 (L) 12/27/2021    HCT 31.3 (L) 12/27/2021    MCV 86.5 12/27/2021     12/27/2021       Lab Results   Component Value Date    GLUCOSE 89 12/27/2021    BUN 21 12/27/2021    CREATININE 1.00 09/11/2023    EGFR 44 06/05/2015    BCR 22.8 12/27/2021    K 3.8 12/27/2021    CO2 23.2 12/27/2021    CALCIUM 8.2 (L) 12/27/2021    ALBUMIN 4.80 12/26/2021    BILITOT 0.2 12/26/2021    AST 14 12/26/2021    ALT " "17 12/26/2021       No results found for: \"HGBA1C\"    Assessment:     1. Primary hypertension    2. Dizziness    3. Mixed hyperlipidemia    4. PVCs (premature ventricular contractions)      HTN, BP was elevated on my recheck 160/60. She is on amlodipine, HCTZ, isosorbide, toprol XL, and ramipril.   Dizziness, orthostatics were negative   HLD on rosuvastatin   PVC's are not seen on ecg today, she has no palpitations, continue toprol   Sinus bradycardia, asymptomatic  H/o breast cancer with radiation.   Chronic angina on isosorbide, stable, no angina recenty    Plan:   Spironolactone was added today for elevated BP   Obtain recent labs from Dr. Ramos's office- repeat BMP in a couple weeks  Return in about 4 weeks (around 10/11/2024) for BP check in 4 weeks with MA -----6 months f/u with RM .    Thank you for allowing me to participate in this patient's care. Please call with any questions or concerns.          Dragon dictation device was utilized in this note.   "

## 2024-09-17 ENCOUNTER — TELEPHONE (OUTPATIENT)
Dept: CARDIOLOGY | Facility: CLINIC | Age: 89
End: 2024-09-17
Payer: MEDICARE

## 2024-09-30 ENCOUNTER — LAB (OUTPATIENT)
Dept: LAB | Facility: HOSPITAL | Age: 89
End: 2024-09-30
Payer: MEDICARE

## 2024-09-30 DIAGNOSIS — E78.2 MIXED HYPERLIPIDEMIA: ICD-10-CM

## 2024-09-30 DIAGNOSIS — R42 DIZZINESS: ICD-10-CM

## 2024-09-30 DIAGNOSIS — I49.3 PVCS (PREMATURE VENTRICULAR CONTRACTIONS): ICD-10-CM

## 2024-09-30 DIAGNOSIS — I10 PRIMARY HYPERTENSION: ICD-10-CM

## 2024-09-30 LAB
ANION GAP SERPL CALCULATED.3IONS-SCNC: 12.3 MMOL/L (ref 5–15)
BUN SERPL-MCNC: 33 MG/DL (ref 8–23)
BUN/CREAT SERPL: 24.3 (ref 7–25)
CALCIUM SPEC-SCNC: 9.8 MG/DL (ref 8.6–10.5)
CHLORIDE SERPL-SCNC: 100 MMOL/L (ref 98–107)
CO2 SERPL-SCNC: 24.7 MMOL/L (ref 22–29)
CREAT SERPL-MCNC: 1.36 MG/DL (ref 0.57–1)
EGFRCR SERPLBLD CKD-EPI 2021: 37.5 ML/MIN/1.73
GLUCOSE SERPL-MCNC: 103 MG/DL (ref 65–99)
POTASSIUM SERPL-SCNC: 4.8 MMOL/L (ref 3.5–5.2)
SODIUM SERPL-SCNC: 137 MMOL/L (ref 136–145)

## 2024-09-30 PROCEDURE — 80048 BASIC METABOLIC PNL TOTAL CA: CPT

## 2024-09-30 PROCEDURE — 36415 COLL VENOUS BLD VENIPUNCTURE: CPT

## 2024-10-01 ENCOUNTER — TELEPHONE (OUTPATIENT)
Dept: CARDIOLOGY | Facility: CLINIC | Age: 89
End: 2024-10-01
Payer: MEDICARE

## 2024-10-01 DIAGNOSIS — I10 PRIMARY HYPERTENSION: Primary | ICD-10-CM

## 2024-10-01 RX ORDER — HYDRALAZINE HYDROCHLORIDE 25 MG/1
25 TABLET, FILM COATED ORAL 3 TIMES DAILY
Qty: 180 TABLET | Refills: 3 | Status: SHIPPED | OUTPATIENT
Start: 2024-10-01

## 2024-10-01 NOTE — TELEPHONE ENCOUNTER
Reviewed recommendations with Jia Gillis and the patient verbalized understanding of the recommendations, with repeat back of medication instructions.  Patient stated she will come in for lab work in about 4 weeks (last week of October).  Patient agreeable to keeping BP check that is scheduled on 10/11/24.    Thank you,  Keira SCOTT RN  Triage Nurse [facility]  10/01/24  10:41 EDT

## 2024-10-01 NOTE — TELEPHONE ENCOUNTER
Yes lets have her decrease that to only a daily dose of the 12.5 mg spironolactone     continue with the plan for repeat labs     hold off on the hydralazine for now

## 2024-10-01 NOTE — TELEPHONE ENCOUNTER
Reviewed results with Jia Gillis and she verbalized understanding of results.  While reviewing recommendations with patient she reported that she had actually been taking spironolactone 25 mg, 0.5 tablets twice daily.      With this in mind do you still want patient to discontinue this medication and switch to hydralazine, or do you want her to try and take the original dose of spironolactone 25 mg, 0.5 tablet daily first?    Please let me know how you would like to proceed.    Thank you,  Keira SCOTT RN  Triage Nurse [facility]  10/01/24  09:07 EDT

## 2024-10-02 ENCOUNTER — TELEPHONE (OUTPATIENT)
Dept: CARDIOLOGY | Facility: CLINIC | Age: 89
End: 2024-10-02
Payer: MEDICARE

## 2024-10-11 ENCOUNTER — CLINICAL SUPPORT (OUTPATIENT)
Dept: CARDIOLOGY | Facility: CLINIC | Age: 89
End: 2024-10-11
Payer: MEDICARE

## 2024-10-11 VITALS
WEIGHT: 119 LBS | SYSTOLIC BLOOD PRESSURE: 130 MMHG | DIASTOLIC BLOOD PRESSURE: 50 MMHG | BODY MASS INDEX: 21.9 KG/M2 | HEART RATE: 55 BPM | HEIGHT: 62 IN

## 2024-10-11 NOTE — PROGRESS NOTES
Patient presented to the clinic for BP check.  Verified and updated patient allergies and medication.  Patient did not c/o symptoms.    BP in office was 130/50 and HR was 55.    Presented patient to SALINAS Brewer.  Provider stated that patient BP looks good.  Continue with what she is doing.  Make sure to do labs in 2 week and we will call if there is any changes needed, at that time.  Patient to keep up coming appointment.  Patient was notified of the above.  Patient verbalized understanding.      JADA Michel

## 2024-10-25 ENCOUNTER — LAB (OUTPATIENT)
Dept: LAB | Facility: HOSPITAL | Age: 89
End: 2024-10-25
Payer: MEDICARE

## 2024-10-25 DIAGNOSIS — I10 PRIMARY HYPERTENSION: ICD-10-CM

## 2024-10-25 LAB
ANION GAP SERPL CALCULATED.3IONS-SCNC: 12.4 MMOL/L (ref 5–15)
BUN SERPL-MCNC: 20 MG/DL (ref 8–23)
BUN/CREAT SERPL: 16.1 (ref 7–25)
CALCIUM SPEC-SCNC: 9.4 MG/DL (ref 8.6–10.5)
CHLORIDE SERPL-SCNC: 102 MMOL/L (ref 98–107)
CO2 SERPL-SCNC: 24.6 MMOL/L (ref 22–29)
CREAT SERPL-MCNC: 1.24 MG/DL (ref 0.57–1)
EGFRCR SERPLBLD CKD-EPI 2021: 41.9 ML/MIN/1.73
GLUCOSE SERPL-MCNC: 94 MG/DL (ref 65–99)
POTASSIUM SERPL-SCNC: 4.7 MMOL/L (ref 3.5–5.2)
SODIUM SERPL-SCNC: 139 MMOL/L (ref 136–145)

## 2024-10-25 PROCEDURE — 80048 BASIC METABOLIC PNL TOTAL CA: CPT

## 2024-10-25 PROCEDURE — 36415 COLL VENOUS BLD VENIPUNCTURE: CPT

## 2024-10-29 ENCOUNTER — TELEPHONE (OUTPATIENT)
Dept: CARDIOLOGY | Facility: CLINIC | Age: 89
End: 2024-10-29
Payer: MEDICARE

## 2024-10-29 NOTE — TELEPHONE ENCOUNTER
"Pt called and stated she had blood work done on 10/25/24, and \"they were not good\". She wants to possibly change medications.    Please advise   "

## 2024-10-31 ENCOUNTER — TELEPHONE (OUTPATIENT)
Dept: CARDIOLOGY | Facility: CLINIC | Age: 89
End: 2024-10-31

## 2024-10-31 NOTE — TELEPHONE ENCOUNTER
Caller: Jia Gillis    Relationship: Self    Best call back number: [phone]    What is the best time to reach you: AFTER 9:30 AM    Who are you requesting to speak with (clinical staff, provider,  specific staff member): CLINICAL    What was the call regarding: PATIENT HAD CALLED AND LEFT MESSAGE ABOUT LABS AND CHANGING MEDICATIONS AND HAS NOT HEARD ANYTHING. PLEASE CONTACT PATIENT TO ADVISE. THANK YOU.     Is it okay if the provider responds through MyChart: CALL

## 2024-10-31 NOTE — TELEPHONE ENCOUNTER
Pt called back:       Caller: Jia Gillis     Relationship: Self     Best call back number: [phone]     What is the best time to reach you: AFTER 9:30 AM     Who are you requesting to speak with (clinical staff, provider,  specific staff member): CLINICAL     What was the call regarding: PATIENT HAD CALLED AND LEFT MESSAGE ABOUT LABS AND CHANGING MEDICATIONS AND HAS NOT HEARD ANYTHING. PLEASE CONTACT PATIENT TO ADVISE. THANK YOU.      Is it okay if the provider responds through MyChart: CALL

## 2024-10-31 NOTE — TELEPHONE ENCOUNTER
Kidney function is improving on recent lab work.   Is she taking any of the spironolactone now?

## 2024-11-01 NOTE — TELEPHONE ENCOUNTER
Results called to pt.  Instructed to call with any further questions or concerns.  Verbalized understanding.    Julianne- pt states that she has not taken spironolactone since she last talked to you (about 2.5 weeks ago).  She states that BP has been ok, yesterday 107/53, and that she is really making an effort to increase water intake.    Montserrat Mathur, RN  Triage Nurse, [facility]  11/01/24 08:54 EDT

## 2025-03-11 RX ORDER — ISOSORBIDE MONONITRATE 30 MG/1
60 TABLET, EXTENDED RELEASE ORAL DAILY
Qty: 60 TABLET | Refills: 11 | Status: SHIPPED | OUTPATIENT
Start: 2025-03-11

## 2025-03-11 NOTE — TELEPHONE ENCOUNTER
Refill Protocol Failed, Requesting isosobide     LOV w/ you on 09/13/24   FU w/ RM 04/18/25  Last Labs- BMP 10/25/24    Please review.    Wilda CHIN, CMA

## 2025-03-18 ENCOUNTER — TELEPHONE (OUTPATIENT)
Dept: CARDIOLOGY | Facility: CLINIC | Age: 89
End: 2025-03-18
Payer: MEDICARE

## 2025-04-01 RX ORDER — RAMIPRIL 10 MG/1
20 CAPSULE ORAL NIGHTLY
Qty: 180 CAPSULE | Refills: 3 | Status: SHIPPED | OUTPATIENT
Start: 2025-04-01

## 2025-04-18 ENCOUNTER — OFFICE VISIT (OUTPATIENT)
Dept: CARDIOLOGY | Facility: CLINIC | Age: 89
End: 2025-04-18
Payer: MEDICARE

## 2025-04-18 VITALS
HEART RATE: 52 BPM | SYSTOLIC BLOOD PRESSURE: 142 MMHG | HEIGHT: 62 IN | WEIGHT: 117 LBS | DIASTOLIC BLOOD PRESSURE: 60 MMHG | BODY MASS INDEX: 21.53 KG/M2

## 2025-04-18 DIAGNOSIS — E78.2 MIXED HYPERLIPIDEMIA: ICD-10-CM

## 2025-04-18 DIAGNOSIS — N18.32 STAGE 3B CHRONIC KIDNEY DISEASE: ICD-10-CM

## 2025-04-18 DIAGNOSIS — I49.3 PVCS (PREMATURE VENTRICULAR CONTRACTIONS): ICD-10-CM

## 2025-04-18 DIAGNOSIS — I10 PRIMARY HYPERTENSION: Primary | ICD-10-CM

## 2025-04-18 NOTE — PROGRESS NOTES
Date of Office Visit: 2025  Encounter Provider: Belkys Burton MD  Place of Service: [facility]  Patient Name: Jia Gillis  :1935      Patient ID:  Jia Gillis is a 89 y.o. female is here for  followup for hypertension.        History of Present Illness    She has a history of hypertension, hyperlipidemia, thyroid disease, history of breast cancer with radiation therapy.  Her breast cancer was first diagnosed in the  on the right side and she had a lumpectomy.  She had a recurrence in  on the right side and had a lumpectomy with radiation therapy and tamoxifen.  She then had a recurrence of breast cancer on the left side in  with a left-sided lumpectomy and no adjuvant therapy.       She is , has 3 children and is retired.  She uses no cigarettes and has social alcohol.  She has 4 cups of coffee per day.  Her sister had a myocardial infarction at the age of 40.  Her mother heart disease and hypertension.  Her son has diabetes.     In 2021, she presented to the [facility] emergency department for evaluation of lightheadedness and near syncope.  She reported that 6 months prior she was experiencing more palpitations-her PCP started her on metoprolol succinate 25 mg daily.  She felt worse after starting the medications and felt like her palpitations were stronger and more prominent.  Her metoprolol was decreased to 12.5 mg twice per day and her palpitations did not improve.  She also noticed when she had a glass of wine that this would increase her palpitations.       Dr. Singleton consulted on her during the hospitalization.  Her thyroid and electrolytes were normal.  Myocardial perfusion study showed no evidence of ischemia and unifocal PVCs stayed the same during the entire test.  Echocardiogram showed normal LVEF, no LVH or diastolic dysfunction, aortic valve sclerosis, and mild mitral annular calcification.  She was discharged  on a 14-day Holter monitor which showed normal sinus rhythm, average heart rate 59, and a total PVC burden of 11.1%.  She had 3 episodes of nonsustained SVT the longest 11 beats in duration and the fastest at a rate of 158.  Her metoprolol was discontinued and she was switched to diltiazem.     She had a stress nuclear perfusion study in 1/25/2023 which showed inferior wall infarct without ischemia.  Echocardiogram done 2/15/2023 showed ejection fraction of 57% with grade 2 diastolic dysfunction, mild to moderate mitral insufficiency, mild aortic insufficiency, aortic valve sclerosis, RVSP 43 mmHg.    Labs on 10/25/2024 showed creatinine 1.24 with otherwise normal BMP.  She has mild exertional dyspnea but no chest pain or pressure.  She has no orthopnea or PND.  She does not feel her heart racing or skipping.  Her energy level is stable.  She lives alone as she is been  for 8 years.  She has no dizziness, syncope or falls.  She is taking her medications as directed.    Past Medical History:   Diagnosis Date    Aortic valve sclerosis     Heart murmur     Hyperlipidemia     Hypertension     Malignant tumor of breast     Palpitations     PVCs (premature ventricular contractions)          Past Surgical History:   Procedure Laterality Date    BREAST BIOPSY      BREAST SURGERY      THYROID SURGERY         Current Outpatient Medications on File Prior to Visit   Medication Sig Dispense Refill    amLODIPine (NORVASC) 10 MG tablet Take 1 tablet by mouth Daily.      FLUoxetine (PROzac) 20 MG capsule Take 1 capsule by mouth Daily.      hydroCHLOROthiazide (HYDRODIURIL) 25 MG tablet Take 1 tablet by mouth Daily. In AM      isosorbide mononitrate (IMDUR) 30 MG 24 hr tablet Take 2 tablets by mouth Daily. 60 tablet 11    metoprolol succinate XL (TOPROL-XL) 25 MG 24 hr tablet metoprolol succinate ER 25 mg tablet,extended release 24 hr   TAKE 1 TABLET BY MOUTH EVERY DAY IN THE EVENING      montelukast (SINGULAIR) 10 MG tablet  "Take 1 tablet by mouth Every Night.      multivitamin (THERAGRAN) tablet tablet Take  by mouth Daily.      ramipril (ALTACE) 10 MG capsule Take 2 capsules by mouth Every Night. 180 capsule 3    rosuvastatin (CRESTOR) 10 MG tablet Take 1 tablet by mouth Daily.      [DISCONTINUED] hydrALAZINE (APRESOLINE) 25 MG tablet Take 1 tablet by mouth 3 (Three) Times a Day. (Patient not taking: Reported on 4/18/2025) 180 tablet 3     No current facility-administered medications on file prior to visit.       Social History     Socioeconomic History    Marital status:    Tobacco Use    Smoking status: Never     Passive exposure: Never    Smokeless tobacco: Never    Tobacco comments:     caffeine use   Vaping Use    Vaping status: Never Used   Substance and Sexual Activity    Alcohol use: Not Currently     Comment: Occ    Drug use: Never    Sexual activity: Not Currently     Partners: Male     Birth control/protection: None             Procedures    ECG 12 Lead    Date/Time: 4/18/2025 1:46 PM  Performed by: Belkys Burton MD    Authorized by: Belkys Burton MD  Comparison: compared with previous ECG   Similar to previous ECG  Rhythm: sinus bradycardia    Clinical impression: normal ECG              Objective:      Vitals:    04/18/25 1336   BP: 142/60   Pulse: 52   Weight: 53.1 kg (117 lb)   Height: 157.5 cm (62\")     Body mass index is 21.4 kg/m².    Vitals reviewed.   Constitutional:       General: Not in acute distress.     Appearance: Not diaphoretic.   Neck:      Vascular: No carotid bruit or JVD.   Pulmonary:      Effort: Pulmonary effort is normal.      Breath sounds: Normal breath sounds.   Cardiovascular:      Normal rate. Regular rhythm.      Murmurs: There is no murmur.      No gallop.  No rub.   Pulses:     Intact distal pulses.      Carotid: 2+ bilaterally.     Radial: 2+ bilaterally.     Dorsalis pedis: 2+ bilaterally.     Posterior tibial: 2+ bilaterally.  Edema:     Peripheral edema absent. "   Neurological:      Cranial Nerves: No cranial nerve deficit.         Lab Review:       Assessment:      Diagnosis Plan   1. Primary hypertension        2. Mixed hyperlipidemia        3. PVCs (premature ventricular contractions)        4. Stage 3b chronic kidney disease          Hypertension, goal < 120/80.  Her blood pressure checks at home are mid 130s systolic.  Maintain her amlodipine, hydrochlorothiazide, metoprolol, ramipril.   Hyperlipidemia, on rosuvastatin.  We will get labs from PCP.  PVCs, on Toprol all for this.  H/o breast cancer with radiation.   Back pain which increases her blood pressure and causes her to exercise less.  Anginal chest pain, well treated with amlodipine, metoprolol and isosorbide.     Plan:       Overall doing well, no medication changes, no other testing this time, advised continued exercise, see Julianne in 1 year.